# Patient Record
Sex: MALE | Race: WHITE | NOT HISPANIC OR LATINO | Employment: FULL TIME | URBAN - NONMETROPOLITAN AREA
[De-identification: names, ages, dates, MRNs, and addresses within clinical notes are randomized per-mention and may not be internally consistent; named-entity substitution may affect disease eponyms.]

---

## 2020-10-17 ENCOUNTER — APPOINTMENT (OUTPATIENT)
Dept: GENERAL RADIOLOGY | Facility: HOSPITAL | Age: 30
End: 2020-10-17

## 2020-10-17 ENCOUNTER — HOSPITAL ENCOUNTER (EMERGENCY)
Facility: HOSPITAL | Age: 30
Discharge: HOME | End: 2020-10-17
Attending: EMERGENCY MEDICINE | Admitting: EMERGENCY MEDICINE

## 2020-10-17 VITALS
DIASTOLIC BLOOD PRESSURE: 94 MMHG | OXYGEN SATURATION: 98 % | SYSTOLIC BLOOD PRESSURE: 173 MMHG | HEIGHT: 72 IN | WEIGHT: 180 LBS | HEART RATE: 93 BPM | RESPIRATION RATE: 18 BRPM | BODY MASS INDEX: 24.38 KG/M2 | TEMPERATURE: 98.3 F

## 2020-10-17 DIAGNOSIS — S99.921A INJURY OF RIGHT GREAT TOE, INITIAL ENCOUNTER: Primary | ICD-10-CM

## 2020-10-17 PROCEDURE — 73630 X-RAY EXAM OF FOOT: CPT

## 2020-10-17 PROCEDURE — 73630 X-RAY EXAM OF FOOT: CPT | Performed by: RADIOLOGY

## 2020-10-17 PROCEDURE — 99283 EMERGENCY DEPT VISIT LOW MDM: CPT

## 2020-10-17 RX ORDER — IBUPROFEN 800 MG/1
800 TABLET ORAL EVERY 8 HOURS PRN
Qty: 30 TABLET | Refills: 0 | Status: SHIPPED | OUTPATIENT
Start: 2020-10-17

## 2020-10-17 NOTE — ED PROVIDER NOTES
Subjective   Patient is a healthy 30-year-old male that presents the ED after sustaining injury to his right foot.  He states that 2 days ago he stubbed his big toe into a concrete step while walking his dog.  He states that he has had pain, swelling, ecchymosis.  He states symptoms are worse when he tries to bear any weight onto that foot.  He denies numbness or tingling.      History provided by:  Patient   used: No    Foot Pain  Location:  Right foot  Severity:  Moderate  Onset quality:  Sudden  Duration:  2 days  Timing:  Constant  Progression:  Worsening  Chronicity:  New  Context:  Stubbed foot into porch step  Associated symptoms: no abdominal pain, no chest pain, no congestion, no cough, no diarrhea, no ear pain, no fatigue, no fever, no headaches, no loss of consciousness, no myalgias, no nausea, no rash, no rhinorrhea, no shortness of breath, no sore throat, no vomiting and no wheezing        Review of Systems   Constitutional: Negative.  Negative for fatigue and fever.   HENT: Negative.  Negative for congestion, ear discharge, ear pain, facial swelling, postnasal drip, rhinorrhea, sinus pressure, sinus pain, sneezing, sore throat, tinnitus and trouble swallowing.    Eyes: Negative.  Negative for pain, discharge, redness and itching.   Respiratory: Negative.  Negative for cough, shortness of breath and wheezing.    Cardiovascular: Negative.  Negative for chest pain.   Gastrointestinal: Negative.  Negative for abdominal pain, diarrhea, nausea and vomiting.   Endocrine: Negative.    Genitourinary: Negative.  Negative for dysuria.   Musculoskeletal: Positive for arthralgias and joint swelling. Negative for myalgias.   Skin: Negative.  Negative for rash.   Neurological: Negative.  Negative for loss of consciousness and headaches.   Hematological: Negative.    Psychiatric/Behavioral: Negative.    All other systems reviewed and are negative.      No past medical history on file.    No Known  Allergies    No past surgical history on file.    No family history on file.    Social History     Socioeconomic History   • Marital status:      Spouse name: Not on file   • Number of children: Not on file   • Years of education: Not on file   • Highest education level: Not on file           Objective   Physical Exam  Vitals signs and nursing note reviewed.   Constitutional:       General: He is not in acute distress.     Appearance: He is well-developed. He is not diaphoretic.   HENT:      Head: Normocephalic and atraumatic.      Right Ear: External ear normal.      Left Ear: External ear normal.      Nose: Nose normal.   Eyes:      Conjunctiva/sclera: Conjunctivae normal.      Pupils: Pupils are equal, round, and reactive to light.   Neck:      Musculoskeletal: Normal range of motion and neck supple.      Vascular: No JVD.      Trachea: No tracheal deviation.   Cardiovascular:      Rate and Rhythm: Normal rate and regular rhythm.      Heart sounds: Normal heart sounds. No murmur.   Pulmonary:      Effort: Pulmonary effort is normal. No respiratory distress.      Breath sounds: Normal breath sounds. No wheezing.   Abdominal:      General: Bowel sounds are normal.      Palpations: Abdomen is soft.      Tenderness: There is no abdominal tenderness.   Musculoskeletal: Normal range of motion.         General: No deformity.      Comments: Examination the right foot reveals moderate swelling and ecchymosis along the first and second toes.  He has moderate tenderness along the head of both the first and second metatarsals.  Range of motion is limited secondary to pain.  Vascular status is intact.   Skin:     General: Skin is warm and dry.      Coloration: Skin is not pale.      Findings: No erythema or rash.   Neurological:      Mental Status: He is alert and oriented to person, place, and time.      Cranial Nerves: No cranial nerve deficit.   Psychiatric:         Behavior: Behavior normal.         Thought  Content: Thought content normal.         Splint - Cast - Strapping    Date/Time: 10/17/2020 1:31 PM  Performed by: Rosalee Patricio PA-C  Authorized by: Gume Myers MD     Consent:     Consent obtained:  Verbal    Consent given by:  Patient    Risks discussed:  Discoloration, numbness, pain and swelling    Alternatives discussed:  No treatment  Pre-procedure details:     Sensation:  Normal    Skin color:  Pink  Procedure details:     Laterality:  Right    Location:  Foot    Foot:  R foot    Strapping: yes      Splint type:  CAM walker boot    Supplies:  Prefabricated splint  Post-procedure details:     Pain:  Improved    Sensation:  Normal    Skin color:  Pink    Patient tolerance of procedure:  Tolerated well, no immediate complications               ED Course  ED Course as of Oct 17 1331   Sat Oct 17, 2020   1330 IMPRESSION:  No acute osseous or articular abnormalities.     This report was finalized on 10/17/2020 1:27 PM by Dr. Aston Ortiz MD.   XR Foot 3+ View Right []   1330 Discussed plan of care with patient.  Advised if symptoms worsen to return to the ED.  Advised to follow-up with family doctor in 1 to 2 days.    []      ED Course User Index  [] Rosalee Patricio PA-C                                           [facility]    Final diagnoses:   Injury of right great toe, initial encounter            Rosalee Patricio PA-C  10/17/20 1331

## 2024-03-06 ENCOUNTER — OFFICE VISIT (OUTPATIENT)
Dept: FAMILY MEDICINE CLINIC | Facility: CLINIC | Age: 34
End: 2024-03-06
Payer: COMMERCIAL

## 2024-03-06 VITALS
HEIGHT: 72 IN | BODY MASS INDEX: 21.67 KG/M2 | WEIGHT: 160 LBS | OXYGEN SATURATION: 96 % | DIASTOLIC BLOOD PRESSURE: 70 MMHG | HEART RATE: 82 BPM | TEMPERATURE: 98.6 F | SYSTOLIC BLOOD PRESSURE: 112 MMHG

## 2024-03-06 DIAGNOSIS — K21.9 GASTROESOPHAGEAL REFLUX DISEASE, UNSPECIFIED WHETHER ESOPHAGITIS PRESENT: ICD-10-CM

## 2024-03-06 DIAGNOSIS — E55.9 VITAMIN D DEFICIENCY: ICD-10-CM

## 2024-03-06 DIAGNOSIS — R13.10 DYSPHAGIA, UNSPECIFIED TYPE: ICD-10-CM

## 2024-03-06 DIAGNOSIS — R20.2 PARESTHESIAS: ICD-10-CM

## 2024-03-06 DIAGNOSIS — Z00.00 HEALTH MAINTENANCE EXAMINATION: Primary | ICD-10-CM

## 2024-03-06 DIAGNOSIS — R61 NIGHT SWEATS: ICD-10-CM

## 2024-03-06 LAB — H PYLORI IGG SER IA-ACNC: NEGATIVE

## 2024-03-06 PROCEDURE — 86677 HELICOBACTER PYLORI ANTIBODY: CPT | Performed by: PHYSICIAN ASSISTANT

## 2024-03-06 PROCEDURE — 82607 VITAMIN B-12: CPT | Performed by: PHYSICIAN ASSISTANT

## 2024-03-06 PROCEDURE — 82306 VITAMIN D 25 HYDROXY: CPT | Performed by: PHYSICIAN ASSISTANT

## 2024-03-06 PROCEDURE — 83036 HEMOGLOBIN GLYCOSYLATED A1C: CPT | Performed by: PHYSICIAN ASSISTANT

## 2024-03-06 PROCEDURE — 85652 RBC SED RATE AUTOMATED: CPT | Performed by: PHYSICIAN ASSISTANT

## 2024-03-06 PROCEDURE — 86038 ANTINUCLEAR ANTIBODIES: CPT | Performed by: PHYSICIAN ASSISTANT

## 2024-03-06 PROCEDURE — 80050 GENERAL HEALTH PANEL: CPT | Performed by: PHYSICIAN ASSISTANT

## 2024-03-06 PROCEDURE — 86140 C-REACTIVE PROTEIN: CPT | Performed by: PHYSICIAN ASSISTANT

## 2024-03-06 PROCEDURE — 84207 ASSAY OF VITAMIN B-6: CPT | Performed by: PHYSICIAN ASSISTANT

## 2024-03-06 PROCEDURE — 83615 LACTATE (LD) (LDH) ENZYME: CPT | Performed by: PHYSICIAN ASSISTANT

## 2024-03-06 PROCEDURE — 84439 ASSAY OF FREE THYROXINE: CPT | Performed by: PHYSICIAN ASSISTANT

## 2024-03-06 PROCEDURE — 80061 LIPID PANEL: CPT | Performed by: PHYSICIAN ASSISTANT

## 2024-03-06 RX ORDER — PANTOPRAZOLE SODIUM 40 MG/1
40 TABLET, DELAYED RELEASE ORAL DAILY
Qty: 30 TABLET | Refills: 1 | Status: SHIPPED | OUTPATIENT
Start: 2024-03-06

## 2024-03-06 NOTE — PROGRESS NOTES
"    Subjective        Chief Complaint  Establish Care    Subjective      Amrit Santiago is a 34 y.o. male who presents today to [facility] for [facility]. He has no significant past medical history.     Difficulty swallowing:   GERD:   Reports noted increased difficulty with swallowing both solids and liquids over the last few months.  He does have episodes of choking at times which is new for him.  He does report acid reflux symptoms if he eats spicy foods or overeats.  Also worse with lying down at night.  He has recently elevated the head of his bed with some improvement in the acid reflux type symptoms.  No previous endoscopies.  No previous medications.    Nights sweats:   He has also noted intermittent night sweats which at times can be significant to where he has to change his clothes or bedding.  No associated fever or chills.  No recent cough.  No weight loss that he has not been trying for.  No recent changes in his bowel habits.  He does generally have a sense of urgency with his bowel movements after eating certain foods and has always had a \"sensitive stomach.\"  No recent diarrhea, constipation, bright red blood per rectum or black stools.  No recent changes in urine output or hematuria.  Diabetes does run in his family and he does feel sweaty and shaky if he has not eaten for long periods.  Has not checked his sugar during these episodes of diaphoresis.  The spells do generally improved with eating something.  No known thyroid issues or prior testing.  No polydipsia, polyuria, polyphagia.  He does drink a lot of caffeine.    Bilateral foot pins and needle sensation:  Intermittent numbness and tingling of the left upper extremity:  Reports a long history of pins and needle sensation in both of his feet.  This is fairly symmetrical.  No known falls or injuries.  When he stands, he has to hold onto something and steady himself or he may fall.  The left upper extremity " "numbness and tingling is not necessarily brought on by repetitive movements. He may have some improvement with moving the LUE when the symptoms occur. Denies any neck pain or stiffness.  No previous falls, injuries, or surgeries to the left upper extremity. He has chronic joint pains in both hands without any known arthritis. He has worked a lot with his hands repetitively over the years.       Current Outpatient Medications:     pantoprazole (PROTONIX) 40 MG EC tablet, Take 1 tablet by mouth Daily., Disp: 30 tablet, Rfl: 1      Not on File    Objective     Objective   Vital Signs:  /70   Pulse 82   Temp 98.6 °F (37 °C) (Temporal)   Ht 182.9 cm (72\")   Wt 72.6 kg (160 lb)   SpO2 96%   BMI 21.70 kg/m²   Estimated body mass index is 21.7 kg/m² as calculated from the following:    Height as of this encounter: 182.9 cm (72\").    Weight as of this encounter: 72.6 kg (160 lb).    BMI is within normal parameters. No other follow-up for BMI required.    History reviewed. No pertinent past medical history.  History reviewed. No pertinent surgical history.    Social History     Socioeconomic History    Marital status:    Tobacco Use    Smoking status: Never     Passive exposure: Never    Smokeless tobacco: Never   Vaping Use    Vaping status: Never Used   Substance and Sexual Activity    Alcohol use: Yes    Drug use: Never    Sexual activity: Defer      Physical Exam  Vitals and nursing note reviewed.   Constitutional:       General: He is not in acute distress.     Appearance: He is well-developed. He is not diaphoretic.   HENT:      Head: Normocephalic and atraumatic.      Right Ear: Tympanic membrane normal.      Left Ear: Tympanic membrane normal.      Nose: No congestion or rhinorrhea.      Mouth/Throat:      Pharynx: No oropharyngeal exudate or posterior oropharyngeal erythema.   Eyes:      General: No scleral icterus.        Right eye: No discharge.         Left eye: No discharge.      " Conjunctiva/sclera: Conjunctivae normal.   Neck:      Vascular: No carotid bruit.   Cardiovascular:      Rate and Rhythm: Normal rate and regular rhythm.      Heart sounds: Normal heart sounds. No murmur heard.     No friction rub. No gallop.   Pulmonary:      Effort: Pulmonary effort is normal. No respiratory distress.      Breath sounds: Normal breath sounds. No wheezing or rales.   Chest:      Chest wall: No tenderness.   Musculoskeletal:         General: No swelling, tenderness, deformity or signs of injury. Normal range of motion.      Cervical back: Normal range of motion and neck supple.   Lymphadenopathy:      Cervical: No cervical adenopathy.   Skin:     General: Skin is warm and dry.      Coloration: Skin is not pale.      Findings: No erythema or rash.   Neurological:      Mental Status: He is alert and oriented to person, place, and time.   Psychiatric:         Behavior: Behavior normal.        Result Review :  The following data was reviewed by: AZEEM Mancini on 03/06/2024:  HDL Cholesterol   Date Value Ref Range Status   02/05/2014 41 (L) 60 - 100 mg/dL Final     Comment:     DF by IF @ 02/05/2014 07:50  HDL Reference Range:     HDL Normal    >60 mg/dL     HDL Risk        <40 mg/dL       LDL Cholesterol    Date Value Ref Range Status   02/05/2014 53 0 - 100 mg/dL Final     Comment:     DF by IF @ 02/05/2014 07:50  LDL Reference Range:      Optimal                       <100 mg/dL      Near Optimal               100-129 mg/dL      Borderline High            130-159 mg/dL      High                             160-189 mg/dL      Very High                     >189 mg/dL       Triglycerides   Date Value Ref Range Status   02/05/2014 80 0 - 150 mg/dL Final     Comment:     DF by IF @ 02/05/2014 07:50  Triglycerides Reference Range:      Normal                 <150 mg/dL      Borderline High    150-199 mg/dL      High                      200-499 mg/dL      Very High            >499 mg/dL       Total  Cholesterol   Date Value Ref Range Status   02/05/2014 110 0 - 200 mg/dL Final     Comment:     DF by IF @ 02/05/2014 07:50  Cholesterol Reference Range:      Desirable                 < 200mg/dl      Borderline High        200-239mg/dl      High Risk                 > 239mg/dl             Assessment / Plan         Assessment   Diagnoses and all orders for this visit:    1. Health maintenance examination (Primary)  2. Vitamin D deficiency  No previous routine labs, he is agreeable to these today.   Will notify of results when available.   -     CBC & Differential  -     Comprehensive Metabolic Panel  -     Lipid Panel  -     TSH  -     Hemoglobin A1c  -     T4, free  -     Vitamin D 25 hydroxy    3. Dysphagia, unspecified type  4. Gastroesophageal reflux disease, unspecified whether esophagitis present  Discussed avoiding triggering foods, not eating within 1-2 hours before laying down, and elevating the head of the bed.   Labs obtained today, we did obtain a H. Pylori IgG.   Obtain barium esophagram.   Add pantoprazole 40mg daily.   Follow up in 6 weeks for recheck.   -     FL ESOPHAGRAM SINGLE CONTRAST; Future  -     H. Pylori Antibody, IgG (COR and ROBBIN only)        -     pantoprazole (PROTONIX) 40 MG EC tablet; Take 1 tablet by mouth Daily.  Dispense: 30 tablet; Refill: 1    5. Paresthesias  Monitor for triggering activities.   Labs obtained today including vitamin b12, b6, JUAN LUIS, TSH, Free T4.   -     Vitamin B12  -     Vitamin B6    6. Night sweat  Routine labs obtained today including CBC, CMP, TSH, Free T4, hA1c. Also check CRP, LDH, ESR, and JUAN LUIS.  -     C-reactive protein  -     Lactate Dehydrogenase  -     Sedimentation Rate  -     JUAN LUIS       New Medications Ordered This Visit   Medications    pantoprazole (PROTONIX) 40 MG EC tablet     Sig: Take 1 tablet by mouth Daily.     Dispense:  30 tablet     Refill:  1     Health Maintenance  Consider updating Tdap.     Follow Up   Return in about 6 weeks (around  4/17/2024) for Recheck GERD, video visit.    Patient was given instructions and counseling regarding his condition or for health maintenance advice. Please see specific information pulled into the AVS if appropriate.       This document has been electronically signed by AZEEM Mancini   March 6, 2024 13:52 EST    Dictated Utilizing Dragon Dictation: Part of this note may be an electronic transcription/translation of spoken language to printed text using the Dragon Dictation System.

## 2024-03-07 LAB
25(OH)D3 SERPL-MCNC: 26.4 NG/ML (ref 30–100)
ALBUMIN SERPL-MCNC: 4.9 G/DL (ref 3.5–5.2)
ALBUMIN/GLOB SERPL: 2.2 G/DL
ALP SERPL-CCNC: 75 U/L (ref 39–117)
ALT SERPL W P-5'-P-CCNC: 29 U/L (ref 1–41)
ANION GAP SERPL CALCULATED.3IONS-SCNC: 13.8 MMOL/L (ref 5–15)
AST SERPL-CCNC: 21 U/L (ref 1–40)
BASOPHILS # BLD AUTO: 0.03 10*3/MM3 (ref 0–0.2)
BASOPHILS NFR BLD AUTO: 0.6 % (ref 0–1.5)
BILIRUB SERPL-MCNC: 0.8 MG/DL (ref 0–1.2)
BUN SERPL-MCNC: 21 MG/DL (ref 6–20)
BUN/CREAT SERPL: 18.6 (ref 7–25)
CALCIUM SPEC-SCNC: 9.4 MG/DL (ref 8.6–10.5)
CHLORIDE SERPL-SCNC: 101 MMOL/L (ref 98–107)
CHOLEST SERPL-MCNC: 152 MG/DL (ref 0–200)
CO2 SERPL-SCNC: 27.2 MMOL/L (ref 22–29)
CREAT SERPL-MCNC: 1.13 MG/DL (ref 0.76–1.27)
CRP SERPL-MCNC: <0.3 MG/DL (ref 0–0.5)
DEPRECATED RDW RBC AUTO: 38.4 FL (ref 37–54)
EGFRCR SERPLBLD CKD-EPI 2021: 88 ML/MIN/1.73
EOSINOPHIL # BLD AUTO: 0.11 10*3/MM3 (ref 0–0.4)
EOSINOPHIL NFR BLD AUTO: 2.3 % (ref 0.3–6.2)
ERYTHROCYTE [DISTWIDTH] IN BLOOD BY AUTOMATED COUNT: 12.8 % (ref 12.3–15.4)
ERYTHROCYTE [SEDIMENTATION RATE] IN BLOOD: 3 MM/HR (ref 0–15)
GLOBULIN UR ELPH-MCNC: 2.2 GM/DL
GLUCOSE SERPL-MCNC: 97 MG/DL (ref 65–99)
HBA1C MFR BLD: 4.7 % (ref 4.8–5.6)
HCT VFR BLD AUTO: 43.3 % (ref 37.5–51)
HDLC SERPL-MCNC: 49 MG/DL (ref 40–60)
HGB BLD-MCNC: 14.9 G/DL (ref 13–17.7)
IMM GRANULOCYTES # BLD AUTO: 0.02 10*3/MM3 (ref 0–0.05)
IMM GRANULOCYTES NFR BLD AUTO: 0.4 % (ref 0–0.5)
LDH SERPL-CCNC: 233 U/L (ref 135–225)
LDLC SERPL CALC-MCNC: 87 MG/DL (ref 0–100)
LDLC/HDLC SERPL: 1.75 {RATIO}
LYMPHOCYTES # BLD AUTO: 1.52 10*3/MM3 (ref 0.7–3.1)
LYMPHOCYTES NFR BLD AUTO: 32.1 % (ref 19.6–45.3)
MCH RBC QN AUTO: 28.9 PG (ref 26.6–33)
MCHC RBC AUTO-ENTMCNC: 34.4 G/DL (ref 31.5–35.7)
MCV RBC AUTO: 84.1 FL (ref 79–97)
MONOCYTES # BLD AUTO: 0.47 10*3/MM3 (ref 0.1–0.9)
MONOCYTES NFR BLD AUTO: 9.9 % (ref 5–12)
NEUTROPHILS NFR BLD AUTO: 2.59 10*3/MM3 (ref 1.7–7)
NEUTROPHILS NFR BLD AUTO: 54.7 % (ref 42.7–76)
NRBC BLD AUTO-RTO: 0 /100 WBC (ref 0–0.2)
PLATELET # BLD AUTO: 182 10*3/MM3 (ref 140–450)
PMV BLD AUTO: 11.6 FL (ref 6–12)
POTASSIUM SERPL-SCNC: 4.4 MMOL/L (ref 3.5–5.2)
PROT SERPL-MCNC: 7.1 G/DL (ref 6–8.5)
RBC # BLD AUTO: 5.15 10*6/MM3 (ref 4.14–5.8)
SODIUM SERPL-SCNC: 142 MMOL/L (ref 136–145)
T4 FREE SERPL-MCNC: 1.33 NG/DL (ref 0.93–1.7)
TRIGL SERPL-MCNC: 87 MG/DL (ref 0–150)
TSH SERPL DL<=0.05 MIU/L-ACNC: 1.58 UIU/ML (ref 0.27–4.2)
VIT B12 BLD-MCNC: 532 PG/ML (ref 211–946)
VLDLC SERPL-MCNC: 16 MG/DL (ref 5–40)
WBC NRBC COR # BLD AUTO: 4.74 10*3/MM3 (ref 3.4–10.8)

## 2024-03-07 RX ORDER — ERGOCALCIFEROL 1.25 MG/1
50000 CAPSULE ORAL WEEKLY
Qty: 5 CAPSULE | Refills: 1 | Status: SHIPPED | OUTPATIENT
Start: 2024-03-07

## 2024-03-08 LAB — ANA SER QL: NEGATIVE

## 2024-03-11 LAB — PYRIDOXAL PHOS SERPL-MCNC: 17.6 UG/L (ref 3.4–65.2)

## 2024-03-13 ENCOUNTER — PATIENT ROUNDING (BHMG ONLY) (OUTPATIENT)
Dept: FAMILY MEDICINE CLINIC | Facility: CLINIC | Age: 34
End: 2024-03-13
Payer: COMMERCIAL

## 2024-03-13 NOTE — PROGRESS NOTES
March 13, 2024    Hello, may I speak with Amrit Santiago?    My name is Arelis Silva    I am  with [facility]  [address] [place] [id number] [phone].    Before we get started may I verify your date of birth? 1990    I am calling to officially welcome you to our practice and ask about your recent visit. Is this a good time to talk? yes    Tell me about your visit with us. What things went well?  It felt very homey and I liked it there.        We're always looking for ways to make our patients' experiences even better. Do you have recommendations on ways we may improve?  no    Overall were you satisfied with your first visit to our practice? yes       I appreciate you taking the time to speak with me today. Is there anything else I can do for you? no      Thank you, and have a great day.

## 2024-05-13 RX ORDER — PANTOPRAZOLE SODIUM 40 MG/1
40 TABLET, DELAYED RELEASE ORAL DAILY
Qty: 30 TABLET | Refills: 3 | Status: SHIPPED | OUTPATIENT
Start: 2024-05-13

## 2024-10-15 ENCOUNTER — TELEMEDICINE (OUTPATIENT)
Dept: FAMILY MEDICINE CLINIC | Facility: CLINIC | Age: 34
End: 2024-10-15
Payer: COMMERCIAL

## 2024-10-15 DIAGNOSIS — F51.04 PSYCHOPHYSIOLOGICAL INSOMNIA: ICD-10-CM

## 2024-10-15 DIAGNOSIS — F90.9 HYPERACTIVITY (BEHAVIOR): ICD-10-CM

## 2024-10-15 DIAGNOSIS — F42.2 MIXED OBSESSIONAL THOUGHTS AND ACTS: Primary | ICD-10-CM

## 2024-10-15 DIAGNOSIS — Z81.8 FAMILY HISTORY OF OCD (OBSESSIVE COMPULSIVE DISORDER): ICD-10-CM

## 2024-10-15 PROBLEM — K21.9 GASTROESOPHAGEAL REFLUX DISEASE WITHOUT ESOPHAGITIS: Status: ACTIVE | Noted: 2024-10-15

## 2024-10-15 PROCEDURE — 99214 OFFICE O/P EST MOD 30 MIN: CPT | Performed by: PHYSICIAN ASSISTANT

## 2024-10-15 RX ORDER — PANTOPRAZOLE SODIUM 40 MG/1
40 TABLET, DELAYED RELEASE ORAL DAILY PRN
Qty: 30 TABLET | Refills: 5 | Status: SHIPPED | OUTPATIENT
Start: 2024-10-15

## 2024-10-15 RX ORDER — FLUVOXAMINE MALEATE 50 MG
50 TABLET ORAL NIGHTLY
Qty: 30 TABLET | Refills: 0 | Status: SHIPPED | OUTPATIENT
Start: 2024-10-15

## 2024-10-15 RX ORDER — FLUVOXAMINE MALEATE 50 MG
TABLET ORAL
Qty: 60 TABLET | Refills: 0 | Status: SHIPPED | OUTPATIENT
Start: 2024-10-15 | End: 2024-10-15

## 2024-10-15 NOTE — PROGRESS NOTES
Subjective        You have chosen to receive care through a telehealth visit.  Do you consent to use a video/audio connection for your medical care today? Yes. The patient is in his vehicle alone. I am at the [facility] clinic in [place].    Chief Complaint  OCD    Subjective      Amrit Santiago is a 34 y.o. male who presents today to [facility] for OCD. He has a past medical history significant for GERD.    Obsessive behaviors:  Difficulty focusing:  Anxiety:  Depression:  He reports a long history of symptoms concerning for obsessive-compulsive disorder.  He reports he is very particular about his things and things around the house/work.  His tools need to be in a certain order and to be replaced back immediately after use, sometimes before tasks are completed.  He reports soaps in the shower have to be of equal volume. When things don't align how he prefers, he experiences significant frustration and angry outbursts.  He also experiences a lot of anxiety when things are not as he prefers.      Symptoms have been present since he was young. His mother, who is now , had similar tendencies and behaviors. He has a sister with diagnosed bipolar disorder. He has not had any prior psychiatric evaluation, however, is interested given symptoms are interfering with daily life, work, and home life.  He reports his angry outbursts are affecting his family and he does not like how it affects his children, specifically his daughter. He feels like he is difficult to live with. He would also like to be evaluated for ADHD given online questionnaires returning positive, chronic trouble focusing, and hyperactivity.     Also reports difficulty with sleep at night, often only getting about 4 hours of sleep which is interrupted frequently.  Reports racing thoughts and difficulty both falling and staying asleep. He has a new baby in the home, however, reports the child  "sleeps fairly well and does not feel his sleep is interrupted by the child frequently. His wife provides most of the night time care for the child.     Has had periods of depressive symptoms in the past as well. Weight has fluctuated with periods of anxiety and depression. Anxiety generally related to obsessive behaviors noted above. Denies any current or past history of SI/HI. Denies any family history of self-harm or suicide.      Current Outpatient Medications:     pantoprazole (PROTONIX) 40 MG EC tablet, Take 1 tablet by mouth Daily As Needed (Acid reflux.)., Disp: 30 tablet, Rfl: 5    fluvoxaMINE (LUVOX) 50 MG tablet, Take 1 tablet by mouth Every Night., Disp: 30 tablet, Rfl: 0    vitamin D (ERGOCALCIFEROL) 1.25 MG (50847 UT) capsule capsule, Take 1 capsule by mouth 1 (One) Time Per Week., Disp: 5 capsule, Rfl: 1      Not on File    Objective     Objective   Vital Signs:  There were no vitals taken for this visit.  Estimated body mass index is 21.7 kg/m² as calculated from the following:    Height as of 3/6/24: 182.9 cm (72\").    Weight as of 3/6/24: 72.6 kg (160 lb).    BMI is within normal parameters. No other follow-up for BMI required.    Past Medical History:   Diagnosis Date    Gastroesophageal reflux disease without esophagitis 10/15/2024     No past surgical history on file.    Social History     Socioeconomic History    Marital status:    Tobacco Use    Smoking status: Never     Passive exposure: Never    Smokeless tobacco: Never   Vaping Use    Vaping status: Never Used   Substance and Sexual Activity    Alcohol use: Yes    Drug use: Never    Sexual activity: Defer      Physical Exam  Constitutional:       Appearance: Normal appearance.   HENT:      Head: Normocephalic and atraumatic.   Eyes:      Extraocular Movements: Extraocular movements intact.      Conjunctiva/sclera: Conjunctivae normal.   Musculoskeletal:         General: Normal range of motion.      Cervical back: Normal range of " motion and neck supple.   Neurological:      Mental Status: He is alert and oriented to person, place, and time.   Psychiatric:         Mood and Affect: Mood normal.         Behavior: Behavior normal.         Thought Content: Thought content normal.         Judgment: Judgment normal.        Result Review :  The following data was reviewed by: AZEEM Mancini on 03/06/2024:  Hemoglobin A1C   Date Value Ref Range Status   03/06/2024 4.70 (L) 4.80 - 5.60 % Final     TSH   Date Value Ref Range Status   03/06/2024 1.580 0.270 - 4.200 uIU/mL Final     HDL Cholesterol   Date Value Ref Range Status   03/06/2024 49 40 - 60 mg/dL Final     LDL Cholesterol    Date Value Ref Range Status   03/06/2024 87 0 - 100 mg/dL Final     Triglycerides   Date Value Ref Range Status   03/06/2024 87 0 - 150 mg/dL Final     Total Cholesterol   Date Value Ref Range Status   02/05/2014 110 0 - 200 mg/dL Final     Comment:     DF by IF @ 02/05/2014 07:50  Cholesterol Reference Range:      Desirable                 < 200mg/dl      Borderline High        200-239mg/dl      High Risk                 > 239mg/dl             Assessment / Plan         Assessment   Diagnoses and all orders for this visit:    1. Mixed obsessional thoughts and acts  2. Family history of OCD (obsessive compulsive disorder)  3. Psychophysiological insomnia  4. Hyperactivity (behavior)  Given symptoms are reported to interfere with everyday life, discussed option of medical therapy. Discussed risk, benefits, alternatives.  Discussed medications are often gradually advanced as needed and gradually discontinued if needed.  Avoid discontinuing medication suddenly as this can precipitate side effects and sometimes withdrawal symptoms.  He expressed understanding and wishes to proceed.  He is interested in psychiatric referral for the above-mentioned symptoms as well as formal ADHD evaluation.  Refer to [facility] psychiatry.  I did discuss medication options with our  psychiatric nurse practitioner. Will start fluvoxamine 50 mg nightly.  Obtain ACT X swab.  Will follow-up in 2 weeks for close monitoring and dose adjustment if needed.  We discussed that if he is experiencing any side effects work has any concerns, return to clinic sooner.  Go to the ER or call 911 with any development of SI/HI.  - Ambulatory Referral to Psychiatry  - PHARMACOGENOMICS PROFILE, ACTX - Swab,; Future     New Medications Ordered This Visit   Medications    fluvoxaMINE (LUVOX) 50 MG tablet     Sig: Take 1 tablet by mouth Every Night.     Dispense:  30 tablet     Refill:  0    pantoprazole (PROTONIX) 40 MG EC tablet     Sig: Take 1 tablet by mouth Daily As Needed (Acid reflux.).     Dispense:  30 tablet     Refill:  5     Health Maintenance  Consider updating Tdap.     Follow Up   Return in about 2 weeks (around 10/29/2024) for Recheck.    Patient was given instructions and counseling regarding his condition or for health maintenance advice. Please see specific information pulled into the AVS if appropriate.       This document has been electronically signed by AZEEM Mancini   October 15, 2024 21:55 EDT    Dictated Utilizing Dragon Dictation: Part of this note may be an electronic transcription/translation of spoken language to printed text using the Dragon Dictation System.

## 2024-10-29 ENCOUNTER — TELEMEDICINE (OUTPATIENT)
Dept: FAMILY MEDICINE CLINIC | Facility: CLINIC | Age: 34
End: 2024-10-29
Payer: COMMERCIAL

## 2024-10-29 DIAGNOSIS — F90.9 HYPERACTIVITY (BEHAVIOR): ICD-10-CM

## 2024-10-29 DIAGNOSIS — F42.2 MIXED OBSESSIONAL THOUGHTS AND ACTS: Primary | ICD-10-CM

## 2024-10-29 DIAGNOSIS — R60.1 GENERALIZED EDEMA: ICD-10-CM

## 2024-10-29 DIAGNOSIS — E55.9 VITAMIN D DEFICIENCY: ICD-10-CM

## 2024-10-29 DIAGNOSIS — F51.04 PSYCHOPHYSIOLOGICAL INSOMNIA: ICD-10-CM

## 2024-10-29 PROCEDURE — 99214 OFFICE O/P EST MOD 30 MIN: CPT | Performed by: PHYSICIAN ASSISTANT

## 2024-10-29 RX ORDER — FLUVOXAMINE MALEATE 50 MG
75 TABLET ORAL NIGHTLY
Qty: 45 TABLET | Refills: 0 | Status: SHIPPED | OUTPATIENT
Start: 2024-10-29

## 2024-10-29 NOTE — PROGRESS NOTES
Subjective      You have chosen to receive care through a telehealth visit.  Do you consent to use a video/audio connection for your medical care today? Yes. The patient is in his vehicle alone. I am at the [facility] in [place].    Chief Complaint  OCD    Subjective      Amrit Santiago is a 34 y.o. male who presents today to [facility] for OCD. He has a past medical history significant for GERD.    Obsessive behaviors:  Difficulty focusing:  Anxiety:  Depression:   He was recently seen for suspected OCD with obsessive behaviors and difficulty focusing.  Obsessive behaviors cause a lot of agitation and anxiety.  He was started on fluvoxamine at 50 mg nightly.  He reports some initial GI upset and insomnia which have improved over time.  He does report missing 2 doses of his medication over the weekend.  He therefore doubled up today and feels dizzy headed.  Otherwise, he had been tolerating the medication fairly well.  Denies any increase in agitation or compulsive behaviors.  Has not seen significant improvement so far, however, has only been on the medication for about 2 weeks.  He has a pending appointment with psychiatry on 11/18/2024. He has noted some mild edema of his hands and face. No other medication changes or dietary changes.       Current Outpatient Medications:     fluvoxaMINE (LUVOX) 50 MG tablet, Take 1 & 1/2 tablets by mouth Every Night., Disp: 45 tablet, Rfl: 0    pantoprazole (PROTONIX) 40 MG EC tablet, Take 1 tablet by mouth Daily As Needed (Acid reflux.)., Disp: 30 tablet, Rfl: 5    vitamin D (ERGOCALCIFEROL) 1.25 MG (64785 UT) capsule capsule, Take 1 capsule by mouth 1 (One) Time Per Week., Disp: 5 capsule, Rfl: 1      Not on File    Objective     Objective   Vital Signs:  There were no vitals taken for this visit.  Estimated body mass index is 21.7 kg/m² as calculated from the following:    Height as of 3/6/24: 182.9 cm  "(72\").    Weight as of 3/6/24: 72.6 kg (160 lb).    BMI is within normal parameters. No other follow-up for BMI required.    Past Medical History:   Diagnosis Date    Gastroesophageal reflux disease without esophagitis 10/15/2024     No past surgical history on file.    Social History     Socioeconomic History    Marital status:    Tobacco Use    Smoking status: Never     Passive exposure: Never    Smokeless tobacco: Never   Vaping Use    Vaping status: Never Used   Substance and Sexual Activity    Alcohol use: Yes    Drug use: Never    Sexual activity: Defer      Physical Exam  Constitutional:       Appearance: Normal appearance.   HENT:      Head: Normocephalic and atraumatic.   Eyes:      Extraocular Movements: Extraocular movements intact.      Conjunctiva/sclera: Conjunctivae normal.   Musculoskeletal:         General: Normal range of motion.      Cervical back: Normal range of motion and neck supple.   Neurological:      Mental Status: He is alert and oriented to person, place, and time.   Psychiatric:         Mood and Affect: Mood normal.         Behavior: Behavior normal.         Thought Content: Thought content normal.         Judgment: Judgment normal.        Result Review :  The following data was reviewed by: AZEEM Mancini on 03/06/2024:  Hemoglobin A1C   Date Value Ref Range Status   03/06/2024 4.70 (L) 4.80 - 5.60 % Final     TSH   Date Value Ref Range Status   03/06/2024 1.580 0.270 - 4.200 uIU/mL Final     HDL Cholesterol   Date Value Ref Range Status   03/06/2024 49 40 - 60 mg/dL Final     LDL Cholesterol    Date Value Ref Range Status   03/06/2024 87 0 - 100 mg/dL Final     Triglycerides   Date Value Ref Range Status   03/06/2024 87 0 - 150 mg/dL Final     Total Cholesterol   Date Value Ref Range Status   02/05/2014 110 0 - 200 mg/dL Final     Comment:     DF by IF @ 02/05/2014 07:50  Cholesterol Reference Range:      Desirable                 < 200mg/dl      Borderline High        " 200-239mg/dl      High Risk                 > 239mg/dl             Assessment / Plan         Assessment   Diagnoses and all orders for this visit:    1. Mixed obsessional thoughts and acts  2. Psychophysiological insomnia  3. Hyperactivity (behavior)  Continue fluvoxamine.  Recommended setting an alarm especially on the weekends to remember to take his medication.  After he's got readjusted to the 50 mg nightly, will increase to 75 mg nightly over the weekend.  Monitor for improvement.  Also monitor for any worsening symptoms or return to clinic if noted.  He has an upcoming appointment with psychiatry on 11/18/2024, encouraged on keeping this appointment.  ACT X swab pending.  Go to the ER or call 911 with any development of SI/HI.    4. Generalized edema   5. Vitamin D Deficiency   Obtain updated labs with CBC, CMP, TSH. Vitamin D level.  His wife is a MA. I've asked him to have her monitor his BP at home and call with any elevated readings.        New Medications Ordered This Visit   Medications    fluvoxaMINE (LUVOX) 50 MG tablet     Sig: Take 1 & 1/2 tablets by mouth Every Night.     Dispense:  45 tablet     Refill:  0     Health Maintenance  Consider updating Tdap.     Follow Up   Return in about 1 month (around 11/29/2024), or if symptoms worsen or fail to improve, for Video visit, follow up OCD..    Patient was given instructions and counseling regarding his condition or for health maintenance advice. Please see specific information pulled into the AVS if appropriate.       This document has been electronically signed by AZEEM Mancini   October 29, 2024 12:12 EDT    Dictated Utilizing Dragon Dictation: Part of this note may be an electronic transcription/translation of spoken language to printed text using the Dragon Dictation System.

## 2024-10-29 NOTE — LETTER
October 29, 2024     Patient: Amrit Santiago   YOB: 1990   Date of Visit: 10/29/2024       To Whom It May Concern:    Amrit Santiago was seen in my clinic on 10/29/24. He may return to work on 10/30/2024.       Sincerely,        AZEEM Mancini    CC: No Recipients

## 2024-11-18 ENCOUNTER — OFFICE VISIT (OUTPATIENT)
Dept: PSYCHIATRY | Facility: CLINIC | Age: 34
End: 2024-11-18
Payer: COMMERCIAL

## 2024-11-18 VITALS
DIASTOLIC BLOOD PRESSURE: 76 MMHG | WEIGHT: 163.4 LBS | SYSTOLIC BLOOD PRESSURE: 140 MMHG | OXYGEN SATURATION: 98 % | HEART RATE: 108 BPM | BODY MASS INDEX: 22.16 KG/M2

## 2024-11-18 DIAGNOSIS — F90.9 ATTENTION DEFICIT HYPERACTIVITY DISORDER (ADHD), UNSPECIFIED ADHD TYPE: Primary | ICD-10-CM

## 2024-11-18 RX ORDER — ATOMOXETINE 25 MG/1
25 CAPSULE ORAL DAILY
Qty: 30 CAPSULE | Refills: 0 | Status: SHIPPED | OUTPATIENT
Start: 2024-11-18

## 2024-11-18 NOTE — LETTER
November 18, 2024     Patient: Amrit Santiago   YOB: 1990   Date of Visit: 11/18/2024       To Whom it May Concern:    Amrit Santiago was seen in my clinic on 11/18/2024.  Please excuse him from work half a day for appointment. If you have any questions or concerns, please don't hesitate to call.         Sincerely,          ERIC Solis        CC: No Recipients
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
## 2025-01-01 ENCOUNTER — APPOINTMENT (OUTPATIENT)
Dept: CT IMAGING | Facility: HOSPITAL | Age: 35
DRG: 917 | End: 2025-01-01
Payer: COMMERCIAL

## 2025-01-01 ENCOUNTER — APPOINTMENT (OUTPATIENT)
Dept: GENERAL RADIOLOGY | Facility: HOSPITAL | Age: 35
DRG: 917 | End: 2025-01-01
Payer: COMMERCIAL

## 2025-01-01 ENCOUNTER — APPOINTMENT (OUTPATIENT)
Dept: CARDIOLOGY | Facility: HOSPITAL | Age: 35
DRG: 917 | End: 2025-01-01
Payer: COMMERCIAL

## 2025-01-01 ENCOUNTER — APPOINTMENT (OUTPATIENT)
Dept: RESPIRATORY THERAPY | Facility: HOSPITAL | Age: 35
DRG: 917 | End: 2025-01-01
Payer: COMMERCIAL

## 2025-01-01 ENCOUNTER — HOSPITAL ENCOUNTER (INPATIENT)
Facility: HOSPITAL | Age: 35
LOS: 2 days | DRG: 917 | End: 2025-08-17
Attending: EMERGENCY MEDICINE | Admitting: INTERNAL MEDICINE
Payer: COMMERCIAL

## 2025-01-01 PROCEDURE — 99233 SBSQ HOSP IP/OBS HIGH 50: CPT | Performed by: INTERNAL MEDICINE

## 2025-01-03 ENCOUNTER — TELEMEDICINE (OUTPATIENT)
Dept: PSYCHIATRY | Facility: CLINIC | Age: 35
End: 2025-01-03
Payer: COMMERCIAL

## 2025-01-03 DIAGNOSIS — F90.9 ATTENTION DEFICIT HYPERACTIVITY DISORDER (ADHD), UNSPECIFIED ADHD TYPE: ICD-10-CM

## 2025-01-03 RX ORDER — ATOMOXETINE 40 MG/1
40 CAPSULE ORAL DAILY
Qty: 30 CAPSULE | Refills: 1 | Status: SHIPPED | OUTPATIENT
Start: 2025-01-03

## 2025-01-03 NOTE — PROGRESS NOTES
Mikaela Santiago is a 34 y.o. male is here today for medication management follow-up.    This provider is located at [facility] at [address] [place] [address]. The Patient is seen remotely at home, using [employer] max. Patient is being seen via telehealth and stated they are in a secure environment for this session. The patient's condition being diagnosed/treated is appropriate for telemedicine. The provider identified him/herself as well as his or her credentials. The patient and/or patients guardian consent to be seen remotely, and when consent is given they understand that the consent allows for patient identifiable information to be sent to a third party as needed. They may refuse to be seen remotely at any time. The electronic data is encrypted and password protected, and the patient has been advised of the potential risks to privacy not withstanding such measures.      Chief Complaint:  ADHD     History of Present Illness:    Patient presents today for a follow up for medication management for ADHD. Denies any medical changes since last visit. Patient states started the medication and finished the prescription. Denies any side effects. Patient states not sure if working or not. Patient reports still having problems with anger. Patient states still going crazy out of nowhere. Patient reports still struggling with bad OCD. Patient states still all to pieces if not in control of things and realizing things that doing it now. Patient states found out mother was diagnosed was manic depression from aunt. Patient reports feel bad about being upset with kids. Patient states went to a house to work on it and there was trash everywhere sent into panic attack. Patient states had to pack stuff up and leave. Patient states will ponder on things for months and if take on a new skill will accomplish it then perfect the skill. Patient states driving self crazy. Patient reports still  stuttering some and have to pause to get back on track. Patient states still get hyper-focused on work. Patient reports maybe sleeping about 4 hours a night. Denies any nightmares. Patient states never feel tired. Patient states he goes full force as soon as wake up. Patient states noticing making weird noises all the time for no reason. Denies any thoughts to harm self or others. Denies any auditory or visual hallucinations. Denies any changes in appetite.   The following portions of the patient's history were reviewed and updated as appropriate: allergies, current medications, past family history, past medical history, past social history, past surgical history, and problem list.    Review of Systems   Constitutional: Negative.    Respiratory: Negative.     Cardiovascular: Negative.    Gastrointestinal: Negative.    Neurological: Negative.    Psychiatric/Behavioral:  Positive for agitation and sleep disturbance. The patient is nervous/anxious.        Objective   Physical Exam  Constitutional:       Appearance: Normal appearance. He is well-developed and well-groomed.   Neurological:      Mental Status: He is alert.   Psychiatric:         Attention and Perception: Attention and perception normal.         Mood and Affect: Mood and affect normal.         Speech: Speech is rapid and pressured.         Behavior: Behavior normal. Behavior is cooperative.         Thought Content: Thought content normal.         Cognition and Memory: Cognition and memory normal.         Judgment: Judgment is impulsive.       There were no vitals taken for this visit.There is no height or weight on file to calculate BMI.  Unable to obtain vitals due to video visit.     Allergies   Allergen Reactions    Luvox [Fluvoxamine] Swelling       Medication List:   Current Outpatient Medications   Medication Sig Dispense Refill    atomoxetine (Strattera) 40 MG capsule Take 1 capsule by mouth Daily. 30 capsule 1    pantoprazole (PROTONIX) 40 MG EC  tablet Take 1 tablet by mouth Daily As Needed (Acid reflux.). 30 tablet 5     No current facility-administered medications for this visit.       Mental Status Exam:   Hygiene:   good  Cooperation:  Cooperative  Eye Contact:  Good  Psychomotor Behavior:  Restless  Affect:  Appropriate  Hopelessness: Denies  Speech:  Rapid  Thought Process:  Goal directed and Linear  Thought Content:  Normal  Suicidal:  None  Homicidal:  None  Hallucinations:  None  Delusion:  None  Memory:  Intact  Orientation:  Person, Place, Time, and Situation  Reliability:  fair  Insight:  Fair  Judgement:  Fair  Impulse Control:  Poor  Physical/Medical Issues:  No               Assessment & Plan   Diagnoses and all orders for this visit:    1. Attention deficit hyperactivity disorder (ADHD), unspecified ADHD type  -     atomoxetine (Strattera) 40 MG capsule; Take 1 capsule by mouth Daily.  Dispense: 30 capsule; Refill: 1            Discussed medication options with patient. Increase Strattera to 40 mg daily for worsening ADHD. Reviewed the risks, benefits, and side effects of the medications; patient acknowledged and verbally consented.  Patient is agreeable to call the office with any questions, concerns, or worsening of symptoms. Patient is aware to call 911 or go to the nearest ER should begin having any thoughts to harm self or others.          Follow up in four weeks          Errors in dictation may reflect use of voice recognition software and not all errors in transcription may have been detected prior to signing.              This document has been electronically signed by ERIC Solis   January 9, 2025 14:17 EST

## 2025-02-06 DIAGNOSIS — F90.9 ATTENTION DEFICIT HYPERACTIVITY DISORDER (ADHD), UNSPECIFIED ADHD TYPE: ICD-10-CM

## 2025-02-06 RX ORDER — ATOMOXETINE 40 MG/1
40 CAPSULE ORAL DAILY
Qty: 30 CAPSULE | Refills: 1 | Status: SHIPPED | OUTPATIENT
Start: 2025-02-06

## 2025-02-17 ENCOUNTER — OFFICE VISIT (OUTPATIENT)
Dept: PSYCHIATRY | Facility: CLINIC | Age: 35
End: 2025-02-17
Payer: COMMERCIAL

## 2025-02-17 VITALS
BODY MASS INDEX: 23.16 KG/M2 | OXYGEN SATURATION: 95 % | SYSTOLIC BLOOD PRESSURE: 132 MMHG | DIASTOLIC BLOOD PRESSURE: 84 MMHG | HEART RATE: 87 BPM | WEIGHT: 170.8 LBS

## 2025-02-17 DIAGNOSIS — F90.9 ATTENTION DEFICIT HYPERACTIVITY DISORDER (ADHD), UNSPECIFIED ADHD TYPE: Primary | ICD-10-CM

## 2025-02-17 DIAGNOSIS — Z79.899 HIGH RISK MEDICATION USE: ICD-10-CM

## 2025-02-17 RX ORDER — LISDEXAMFETAMINE DIMESYLATE 10 MG/1
10 CAPSULE ORAL DAILY
Qty: 30 CAPSULE | Refills: 0 | Status: SHIPPED | OUTPATIENT
Start: 2025-02-17

## 2025-02-17 NOTE — PROGRESS NOTES
Mikaela Santiago is a 35 y.o. male is here today for medication management follow-up.    Chief Complaint:  ADHD anxiety     History of Present Illness:    Patient presents today for follow up for medication management for ADHD and anxiety. Patient states he didn't take the last medication bottle due to not feeling like it was doing anything to him. Patient states didn't notice if did anything or not to him. Patient states going down wormholes a lot with mental health things. Patient states still dealing with a lot of energy. Patient reports constantly moving hands and tapping feet. Patient states even sitting in lobby feel can run up wall. Patient reports still have certain things that have to do or can't leave when working. Patient states such as going through a whole trailer and check every wall before can leave. Patient states some things struggle with others wanting to work with him because of it. Patient states struggling with losing train of thought in the middle of conversation. Patient states have times in which randomly get sad for no reason then within a few mins a lot of anger. Patient states he does have down moments and have days in which feel all emotions in one day. Denies any depression or sadness. Patient states have so much energy not able to focus. Patient reports happy all the time until triggered. Patient denies any panic attacks. Patient states can't be in any room that is cluttered anymore without having a fit. Patient states can't go into daughters room because of clutter. Patient states not ever tired and get straight out of bed. Patient reports sleeping 4 hours a night and patient denies any nightmares. Patient reports appetite is good. Patient eating 2-3 meals a day. Patient denies any auditory or visual hallucinations. Patient adamantly denies any thoughts to harm self or others. Patient denies any side effects to medications. Patient denies any medical changes since last  visit.   The following portions of the patient's history were reviewed and updated as appropriate: allergies, current medications, past family history, past medical history, past social history, past surgical history, and problem list.    Review of Systems   Constitutional: Negative.    Respiratory: Negative.     Cardiovascular: Negative.    Gastrointestinal: Negative.    Neurological: Negative.    Psychiatric/Behavioral:  Positive for decreased concentration. The patient is hyperactive.        Objective   Physical Exam  Vitals reviewed.   Constitutional:       Appearance: Normal appearance. He is well-developed and well-groomed.   Neurological:      Mental Status: He is alert.   Psychiatric:         Attention and Perception: Attention and perception normal.         Mood and Affect: Affect normal. Mood is anxious.         Speech: Speech is rapid and pressured.         Behavior: Behavior is hyperactive. Behavior is cooperative.         Thought Content: Thought content normal.         Cognition and Memory: Cognition and memory normal.         Judgment: Judgment normal.       Blood pressure 132/84, pulse 87, weight 77.5 kg (170 lb 12.8 oz), SpO2 95%.Body mass index is 23.16 kg/m².    Allergies   Allergen Reactions    Luvox [Fluvoxamine] Swelling       Medication List:   Current Outpatient Medications   Medication Sig Dispense Refill    amoxicillin-clavulanate (AUGMENTIN) 875-125 MG per tablet Take 1 tablet by mouth Every 12 (Twelve) Hours for 10 days. 20 tablet 0    fluticasone (FLONASE) 50 MCG/ACT nasal spray Administer 2 sprays into each nostril daily as directed by provider. 16 g 0    ibuprofen (ADVIL,MOTRIN) 800 MG tablet Take 1 tablet by mouth Every 6 (Six) Hours As Needed for Mild Pain. 30 tablet 0    levocetirizine (XYZAL) 5 MG tablet Take 1 tablet by mouth Every Evening. 30 tablet 0    lisdexamfetamine dimesylate (Vyvanse) 10 MG capsule Take 1 capsule by mouth Daily 30 capsule 0    pantoprazole (PROTONIX) 40 MG  EC tablet Take 1 tablet by mouth Daily As Needed (Acid reflux.). 30 tablet 5     No current facility-administered medications for this visit.       Mental Status Exam:   Hygiene:   good  Cooperation:  Cooperative  Eye Contact:  Good  Psychomotor Behavior:  Restless  Affect:  Appropriate  Hopelessness: Denies  Speech:  Rapid  Thought Process:  Goal directed and Linear  Thought Content:  Normal  Suicidal:  None  Homicidal:  None  Hallucinations:  None  Delusion:  None  Memory:  Intact  Orientation:  Person, Place, Time, and Situation  Reliability:  fair  Insight:  Fair  Judgement:  Fair  Impulse Control:  Fair  Physical/Medical Issues:  No               Assessment & Plan   Diagnoses and all orders for this visit:    1. Attention deficit hyperactivity disorder (ADHD), unspecified ADHD type (Primary)  -     lisdexamfetamine dimesylate (Vyvanse) 10 MG capsule; Take 1 capsule by mouth Daily  Dispense: 30 capsule; Refill: 0    2. High risk medication use  -     Urine Drug Screen - Urine, Clean Catch; Future  -     Urine Drug Screen - Urine, Clean Catch            Discussed medication options with patient. Discont. Strattera. Start Vyvanse 10 mg daily for worsening ADHD. Reviewed the risks, benefits, and side effects of the medications; patient acknowledged and verbally consented.  Patient was provided education regarding treatment and treatment options.  Extensive education is provided regarding stimulants including cardiac risk, risk of growth delay, weight loss, and cardiac issues. Patient is being prescribed a controlled substance as part of treatment plan.   UDS ordered.     [facility] Patient Controlled Substance Report (from 2/29/2024 to 2/28/2025)    Dispensed  Strength Quantity Days Supply Provider Pharmacy   02/24/2025 Lisdexamfetamine Dimesyla 10MG 30 each 30 [facility] ...         Disclaimer    *The information in this report is based upon Schedule II through V controlled substance records  reported by dispensers. Data should appear on [facility] reports within two to three business days after dispensing.   *The records listed in the report are based on the patient identification information entered by the report requestor, and if not sufficiently unique may result in the report including records for multiple patients. Please verify the information in the report by contacting the prescribers and/or dispensers listed.   *If the controlled substance records on this report appear to be in error, the patient or provider should contact the dispenser to determine if the information was reported accurately. If the dispenser certifies the information was reported accurately, the dispenser can contact the Drug Enforcement and Professional Practices Branch at [phone] to investigate the error.   *The information in this report is intended for informational use only by the person authorized to request the report. Intentional disclosure of the report or data to someone not authorized to obtain the data is a Class B Misdemeanor.      Report Restrictions - A practitioner or pharmacist may share the report with the patient or person authorized to act on the patient's behalf and place the report in the patient's medical record, with the report then being deemed a medical record subject to the same disclosure terms and conditions as an ordinary medical record. ([place]202)       Patient is agreeable to call the office with any questions, concerns, or worsening of symptoms. Patient is aware to call 911 or go to the nearest ER should begin having thoughts to harm self or others.        Follow up in four weeks        Errors in dictation may reflect use of voice recognition software and not all errors in transcription may have been detected prior to signing.               This document has been electronically signed by ERIC Solis   February 28, 2025 11:18 EST

## 2025-02-27 ENCOUNTER — TELEMEDICINE (OUTPATIENT)
Dept: FAMILY MEDICINE CLINIC | Facility: CLINIC | Age: 35
End: 2025-02-27
Payer: COMMERCIAL

## 2025-02-27 DIAGNOSIS — J02.9 PHARYNGITIS, UNSPECIFIED ETIOLOGY: Primary | ICD-10-CM

## 2025-02-27 DIAGNOSIS — M79.10 MYALGIA: ICD-10-CM

## 2025-02-27 DIAGNOSIS — J01.00 ACUTE NON-RECURRENT MAXILLARY SINUSITIS: ICD-10-CM

## 2025-02-27 PROCEDURE — 99213 OFFICE O/P EST LOW 20 MIN: CPT | Performed by: PHYSICIAN ASSISTANT

## 2025-02-27 RX ORDER — IBUPROFEN 800 MG/1
800 TABLET, FILM COATED ORAL EVERY 6 HOURS PRN
Qty: 30 TABLET | Refills: 0 | Status: SHIPPED | OUTPATIENT
Start: 2025-02-27

## 2025-02-27 RX ORDER — LEVOCETIRIZINE DIHYDROCHLORIDE 5 MG/1
5 TABLET, FILM COATED ORAL EVERY EVENING
Qty: 30 TABLET | Refills: 0 | Status: SHIPPED | OUTPATIENT
Start: 2025-02-27

## 2025-02-27 RX ORDER — FLUTICASONE PROPIONATE 50 MCG
2 SPRAY, SUSPENSION (ML) NASAL DAILY
Qty: 16 G | Refills: 0 | Status: SHIPPED | OUTPATIENT
Start: 2025-02-27

## 2025-02-27 NOTE — LETTER
February 27, 2025     Patient: Amrit Santiago   YOB: 1990   Date of Visit: 2/27/2025       To Whom It May Concern:    It is my medical opinion that Amrit Santiago may return to work in two days.         Sincerely,        AZEEM Mancini    CC: No Recipients

## 2025-02-27 NOTE — PROGRESS NOTES
Subjective      You have chosen to receive care through a telehealth visit.  Do you consent to use a video/audio connection for your medical care today? Yes. The patient is in his vehicle alone. I am at the [facility] in [place].    Chief Complaint  Sore Throat    Subjective      Amrit Santiago is a 35 y.o. male who presents today to [facility] for Sore Throat. He has a past medical history significant for GERD.    Sore throat:  Body aches:   He reports being ill with influenza A about 3 weeks ago.  He reports about 3 days of illness, then gradually feeling better.  Since then, he has had recurrent sore throat, sinus congestion, bilateral maxillary sinus tenderness.  He has also had generalized bodyaches and fatigue.  Denies any known recent fever or chills.  Has not appreciated any nodules in the neck, but is also not checked.  Reports throat has been red and irritated.      Current Outpatient Medications:     amoxicillin-clavulanate (AUGMENTIN) 875-125 MG per tablet, Take 1 tablet by mouth Every 12 (Twelve) Hours for 10 days., Disp: 20 tablet, Rfl: 0    fluticasone (FLONASE) 50 MCG/ACT nasal spray, Administer 2 sprays into each nostril daily as directed by provider., Disp: 16 g, Rfl: 0    ibuprofen (ADVIL,MOTRIN) 800 MG tablet, Take 1 tablet by mouth Every 6 (Six) Hours As Needed for Mild Pain., Disp: 30 tablet, Rfl: 0    levocetirizine (XYZAL) 5 MG tablet, Take 1 tablet by mouth Every Evening., Disp: 30 tablet, Rfl: 0    lisdexamfetamine dimesylate (Vyvanse) 10 MG capsule, Take 1 capsule by mouth Daily, Disp: 30 capsule, Rfl: 0    pantoprazole (PROTONIX) 40 MG EC tablet, Take 1 tablet by mouth Daily As Needed (Acid reflux.)., Disp: 30 tablet, Rfl: 5      Allergies   Allergen Reactions    Luvox [Fluvoxamine] Swelling     Objective     Objective   Vital Signs:  There were no vitals taken for this visit.  Estimated body mass index is 23.16 kg/m² as  "calculated from the following:    Height as of 3/6/24: 182.9 cm (72\").    Weight as of 2/17/25: 77.5 kg (170 lb 12.8 oz).    BMI is within normal parameters. No other follow-up for BMI required.    Past Medical History:   Diagnosis Date    Gastroesophageal reflux disease without esophagitis 10/15/2024    Tamarack teeth removed      No past surgical history on file.    Social History     Socioeconomic History    Marital status:    Tobacco Use    Smoking status: Never     Passive exposure: Never    Smokeless tobacco: Never   Vaping Use    Vaping status: Never Used   Substance and Sexual Activity    Alcohol use: Yes    Drug use: Never    Sexual activity: Defer      Physical Exam  Constitutional:       Appearance: Normal appearance.   HENT:      Head: Normocephalic and atraumatic.   Eyes:      Extraocular Movements: Extraocular movements intact.      Conjunctiva/sclera: Conjunctivae normal.   Musculoskeletal:         General: Normal range of motion.      Cervical back: Normal range of motion and neck supple.   Neurological:      Mental Status: He is alert and oriented to person, place, and time.   Psychiatric:         Mood and Affect: Mood normal.         Behavior: Behavior normal.         Thought Content: Thought content normal.         Judgment: Judgment normal.        Result Review :  The following data was reviewed by: AZEEM Mancini on 03/06/2024:  Hemoglobin A1C   Date Value Ref Range Status   03/06/2024 4.70 (L) 4.80 - 5.60 % Final     TSH   Date Value Ref Range Status   03/06/2024 1.580 0.270 - 4.200 uIU/mL Final     HDL Cholesterol   Date Value Ref Range Status   03/06/2024 49 40 - 60 mg/dL Final     LDL Cholesterol    Date Value Ref Range Status   03/06/2024 87 0 - 100 mg/dL Final     Triglycerides   Date Value Ref Range Status   03/06/2024 87 0 - 150 mg/dL Final     Total Cholesterol   Date Value Ref Range Status   02/05/2014 110 0 - 200 mg/dL Final     Comment:     DF by IF @ 02/05/2014 " 07:50  Cholesterol Reference Range:      Desirable                 < 200mg/dl      Borderline High        200-239mg/dl      High Risk                 > 239mg/dl             Assessment / Plan         Assessment   Diagnoses and all orders for this visit:    1. Pharyngitis, unspecified etiology  2. Acute non-recurrent maxillary sinusitis  3. Myalgia  -Continue supportive care with rest, adequate oral fluid intake, symptomatic care.   -Could consider at home COVID testing or could come in for a COVID/flu A/flu B swab.  -Will cover for secondary bacterial pharyngitis/sinusitis with Augmentin 875-125 mg twice daily x 10 days.  -Add Xyzal 5 mg nightly.  -Add fluticasone 2 sprays each nostril daily.  -Add ibuprofen 100 mg 3 times daily as needed for myalgias.    -Increase oral fluid intake with water, Gatorade, Pedialyte, Powerade, Etc.  -Return to clinic if no improvement noted or if symptoms are worsening.        New Medications Ordered This Visit   Medications    amoxicillin-clavulanate (AUGMENTIN) 875-125 MG per tablet     Sig: Take 1 tablet by mouth Every 12 (Twelve) Hours for 10 days.     Dispense:  20 tablet     Refill:  0    levocetirizine (XYZAL) 5 MG tablet     Sig: Take 1 tablet by mouth Every Evening.     Dispense:  30 tablet     Refill:  0    fluticasone (FLONASE) 50 MCG/ACT nasal spray     Sig: Administer 2 sprays into each nostril daily as directed by provider.     Dispense:  16 g     Refill:  0    ibuprofen (ADVIL,MOTRIN) 800 MG tablet     Sig: Take 1 tablet by mouth Every 6 (Six) Hours As Needed for Mild Pain.     Dispense:  30 tablet     Refill:  0     Health Maintenance  Consider updating Tdap.     Follow Up   Return if symptoms worsen or fail to improve.    Patient was given instructions and counseling regarding his condition or for health maintenance advice. Please see specific information pulled into the AVS if appropriate.       This document has been electronically signed by AZEEM Mancini    February 27, 2025 12:44 EST    Dictated Utilizing Dragon Dictation: Part of this note may be an electronic transcription/translation of spoken language to printed text using the Dragon Dictation System.

## 2025-03-21 DIAGNOSIS — F90.9 ATTENTION DEFICIT HYPERACTIVITY DISORDER (ADHD), UNSPECIFIED ADHD TYPE: ICD-10-CM

## 2025-03-21 RX ORDER — LISDEXAMFETAMINE DIMESYLATE 10 MG/1
10 CAPSULE ORAL DAILY
Qty: 30 CAPSULE | Refills: 0 | Status: SHIPPED | OUTPATIENT
Start: 2025-03-21

## 2025-03-25 DIAGNOSIS — F90.9 ATTENTION DEFICIT HYPERACTIVITY DISORDER (ADHD), UNSPECIFIED ADHD TYPE: Primary | ICD-10-CM

## 2025-03-25 RX ORDER — DEXTROAMPHETAMINE SACCHARATE, AMPHETAMINE ASPARTATE, DEXTROAMPHETAMINE SULFATE AND AMPHETAMINE SULFATE 1.25; 1.25; 1.25; 1.25 MG/1; MG/1; MG/1; MG/1
5 TABLET ORAL 2 TIMES DAILY
Qty: 60 TABLET | Refills: 0 | Status: SHIPPED | OUTPATIENT
Start: 2025-03-25 | End: 2026-03-25

## 2025-04-06 ENCOUNTER — HOSPITAL ENCOUNTER (EMERGENCY)
Facility: HOSPITAL | Age: 35
Discharge: STILL ADMITTED OR ADMINISTRATIVE | DRG: 885 | End: 2025-04-06
Attending: STUDENT IN AN ORGANIZED HEALTH CARE EDUCATION/TRAINING PROGRAM | Admitting: STUDENT IN AN ORGANIZED HEALTH CARE EDUCATION/TRAINING PROGRAM
Payer: COMMERCIAL

## 2025-04-06 ENCOUNTER — HOSPITAL ENCOUNTER (INPATIENT)
Facility: HOSPITAL | Age: 35
LOS: 2 days | Discharge: HOME | DRG: 885 | End: 2025-04-08
Attending: PSYCHIATRY & NEUROLOGY | Admitting: PSYCHIATRY & NEUROLOGY
Payer: COMMERCIAL

## 2025-04-06 VITALS
BODY MASS INDEX: 21.7 KG/M2 | RESPIRATION RATE: 18 BRPM | WEIGHT: 155 LBS | SYSTOLIC BLOOD PRESSURE: 145 MMHG | HEART RATE: 105 BPM | DIASTOLIC BLOOD PRESSURE: 96 MMHG | OXYGEN SATURATION: 99 % | TEMPERATURE: 98.5 F | HEIGHT: 71 IN

## 2025-04-06 DIAGNOSIS — F29 PSYCHOSIS, UNSPECIFIED PSYCHOSIS TYPE: Primary | ICD-10-CM

## 2025-04-06 LAB
ALBUMIN SERPL-MCNC: 4.1 G/DL (ref 3.5–5.2)
ALBUMIN/GLOB SERPL: 1.4 G/DL
ALP SERPL-CCNC: 79 U/L (ref 39–117)
ALT SERPL W P-5'-P-CCNC: 20 U/L (ref 1–41)
AMPHET+METHAMPHET UR QL: POSITIVE
AMPHETAMINES UR QL: NEGATIVE
ANION GAP SERPL CALCULATED.3IONS-SCNC: 8.1 MMOL/L (ref 5–15)
AST SERPL-CCNC: 19 U/L (ref 1–40)
BACTERIA UR QL AUTO: NORMAL /HPF
BARBITURATES UR QL SCN: NEGATIVE
BASOPHILS # BLD AUTO: 0.07 10*3/MM3 (ref 0–0.2)
BASOPHILS NFR BLD AUTO: 0.6 % (ref 0–1.5)
BENZODIAZ UR QL SCN: NEGATIVE
BILIRUB SERPL-MCNC: 0.4 MG/DL (ref 0–1.2)
BILIRUB UR QL STRIP: NEGATIVE
BUN SERPL-MCNC: 13 MG/DL (ref 6–20)
BUN/CREAT SERPL: 12 (ref 7–25)
BUPRENORPHINE SERPL-MCNC: NEGATIVE NG/ML
CALCIUM SPEC-SCNC: 8.6 MG/DL (ref 8.6–10.5)
CANNABINOIDS SERPL QL: NEGATIVE
CHLORIDE SERPL-SCNC: 100 MMOL/L (ref 98–107)
CLARITY UR: CLEAR
CO2 SERPL-SCNC: 26.9 MMOL/L (ref 22–29)
COCAINE UR QL: POSITIVE
COLOR UR: YELLOW
CREAT SERPL-MCNC: 1.08 MG/DL (ref 0.76–1.27)
DEPRECATED RDW RBC AUTO: 43.9 FL (ref 37–54)
EGFRCR SERPLBLD CKD-EPI 2021: 92.3 ML/MIN/1.73
EOSINOPHIL # BLD AUTO: 0.16 10*3/MM3 (ref 0–0.4)
EOSINOPHIL NFR BLD AUTO: 1.4 % (ref 0.3–6.2)
ERYTHROCYTE [DISTWIDTH] IN BLOOD BY AUTOMATED COUNT: 13.4 % (ref 12.3–15.4)
ETHANOL BLD-MCNC: <10 MG/DL (ref 0–10)
ETHANOL UR QL: <0.01 %
FENTANYL UR-MCNC: NEGATIVE NG/ML
GLOBULIN UR ELPH-MCNC: 3 GM/DL
GLUCOSE SERPL-MCNC: 138 MG/DL (ref 65–99)
GLUCOSE UR STRIP-MCNC: NEGATIVE MG/DL
HAV IGM SERPL QL IA: NORMAL
HBV CORE IGM SERPL QL IA: NORMAL
HBV SURFACE AG SERPL QL IA: NORMAL
HCT VFR BLD AUTO: 48.2 % (ref 37.5–51)
HCV AB SER QL: NORMAL
HGB BLD-MCNC: 15.7 G/DL (ref 13–17.7)
HGB UR QL STRIP.AUTO: NEGATIVE
HOLD SPECIMEN: NORMAL
HOLD SPECIMEN: NORMAL
HYALINE CASTS UR QL AUTO: NORMAL /LPF
IMM GRANULOCYTES # BLD AUTO: 0.05 10*3/MM3 (ref 0–0.05)
IMM GRANULOCYTES NFR BLD AUTO: 0.4 % (ref 0–0.5)
KETONES UR QL STRIP: ABNORMAL
LEUKOCYTE ESTERASE UR QL STRIP.AUTO: ABNORMAL
LYMPHOCYTES # BLD AUTO: 1.88 10*3/MM3 (ref 0.7–3.1)
LYMPHOCYTES NFR BLD AUTO: 16 % (ref 19.6–45.3)
MAGNESIUM SERPL-MCNC: 2.1 MG/DL (ref 1.6–2.6)
MCH RBC QN AUTO: 29.3 PG (ref 26.6–33)
MCHC RBC AUTO-ENTMCNC: 32.6 G/DL (ref 31.5–35.7)
MCV RBC AUTO: 89.9 FL (ref 79–97)
METHADONE UR QL SCN: NEGATIVE
MONOCYTES # BLD AUTO: 0.77 10*3/MM3 (ref 0.1–0.9)
MONOCYTES NFR BLD AUTO: 6.5 % (ref 5–12)
NEUTROPHILS NFR BLD AUTO: 75.1 % (ref 42.7–76)
NEUTROPHILS NFR BLD AUTO: 8.85 10*3/MM3 (ref 1.7–7)
NITRITE UR QL STRIP: NEGATIVE
NRBC BLD AUTO-RTO: 0 /100 WBC (ref 0–0.2)
OPIATES UR QL: NEGATIVE
OXYCODONE UR QL SCN: NEGATIVE
PCP UR QL SCN: NEGATIVE
PH UR STRIP.AUTO: 6.5 [PH] (ref 5–8)
PLATELET # BLD AUTO: 222 10*3/MM3 (ref 140–450)
PMV BLD AUTO: 10.4 FL (ref 6–12)
POTASSIUM SERPL-SCNC: 4.1 MMOL/L (ref 3.5–5.2)
PROT SERPL-MCNC: 7.1 G/DL (ref 6–8.5)
PROT UR QL STRIP: ABNORMAL
RBC # BLD AUTO: 5.36 10*6/MM3 (ref 4.14–5.8)
RBC # UR STRIP: NORMAL /HPF
REF LAB TEST METHOD: NORMAL
SODIUM SERPL-SCNC: 135 MMOL/L (ref 136–145)
SP GR UR STRIP: 1.02 (ref 1–1.03)
SQUAMOUS #/AREA URNS HPF: NORMAL /HPF
TRICYCLICS UR QL SCN: NEGATIVE
UROBILINOGEN UR QL STRIP: ABNORMAL
WBC # UR STRIP: NORMAL /HPF
WBC NRBC COR # BLD AUTO: 11.78 10*3/MM3 (ref 3.4–10.8)
WHOLE BLOOD HOLD COAG: NORMAL
WHOLE BLOOD HOLD SPECIMEN: NORMAL

## 2025-04-06 PROCEDURE — 81001 URINALYSIS AUTO W/SCOPE: CPT | Performed by: PHYSICIAN ASSISTANT

## 2025-04-06 PROCEDURE — 36415 COLL VENOUS BLD VENIPUNCTURE: CPT

## 2025-04-06 PROCEDURE — 82077 ASSAY SPEC XCP UR&BREATH IA: CPT | Performed by: PHYSICIAN ASSISTANT

## 2025-04-06 PROCEDURE — 93010 ELECTROCARDIOGRAM REPORT: CPT | Performed by: INTERNAL MEDICINE

## 2025-04-06 PROCEDURE — 83735 ASSAY OF MAGNESIUM: CPT | Performed by: PHYSICIAN ASSISTANT

## 2025-04-06 PROCEDURE — 80307 DRUG TEST PRSMV CHEM ANLYZR: CPT | Performed by: PHYSICIAN ASSISTANT

## 2025-04-06 PROCEDURE — 80053 COMPREHEN METABOLIC PANEL: CPT | Performed by: PHYSICIAN ASSISTANT

## 2025-04-06 PROCEDURE — 80074 ACUTE HEPATITIS PANEL: CPT | Performed by: PSYCHIATRY & NEUROLOGY

## 2025-04-06 PROCEDURE — 93005 ELECTROCARDIOGRAM TRACING: CPT | Performed by: PSYCHIATRY & NEUROLOGY

## 2025-04-06 PROCEDURE — 99285 EMERGENCY DEPT VISIT HI MDM: CPT

## 2025-04-06 PROCEDURE — 85025 COMPLETE CBC W/AUTO DIFF WBC: CPT | Performed by: PHYSICIAN ASSISTANT

## 2025-04-06 RX ORDER — BENZTROPINE MESYLATE 1 MG/1
2 TABLET ORAL ONCE AS NEEDED
Status: DISCONTINUED | OUTPATIENT
Start: 2025-04-06 | End: 2025-04-08 | Stop reason: HOSPADM

## 2025-04-06 RX ORDER — HALOPERIDOL 5 MG/1
5 TABLET ORAL EVERY 6 HOURS PRN
Status: DISCONTINUED | OUTPATIENT
Start: 2025-04-06 | End: 2025-04-08 | Stop reason: HOSPADM

## 2025-04-06 RX ORDER — TRAZODONE HYDROCHLORIDE 50 MG/1
50 TABLET ORAL NIGHTLY PRN
Status: DISCONTINUED | OUTPATIENT
Start: 2025-04-06 | End: 2025-04-08 | Stop reason: HOSPADM

## 2025-04-06 RX ORDER — OLANZAPINE 5 MG/1
5 TABLET, FILM COATED ORAL ONCE
Status: COMPLETED | OUTPATIENT
Start: 2025-04-06 | End: 2025-04-06

## 2025-04-06 RX ORDER — CETIRIZINE HYDROCHLORIDE 10 MG/1
10 TABLET ORAL DAILY
Status: DISCONTINUED | OUTPATIENT
Start: 2025-04-06 | End: 2025-04-08 | Stop reason: HOSPADM

## 2025-04-06 RX ORDER — IBUPROFEN 400 MG/1
400 TABLET, FILM COATED ORAL EVERY 6 HOURS PRN
Status: DISCONTINUED | OUTPATIENT
Start: 2025-04-06 | End: 2025-04-08 | Stop reason: HOSPADM

## 2025-04-06 RX ORDER — LORAZEPAM 2 MG/ML
2 INJECTION INTRAMUSCULAR EVERY 6 HOURS PRN
Status: DISCONTINUED | OUTPATIENT
Start: 2025-04-06 | End: 2025-04-08 | Stop reason: HOSPADM

## 2025-04-06 RX ORDER — BENZONATATE 100 MG/1
100 CAPSULE ORAL 3 TIMES DAILY PRN
Status: DISCONTINUED | OUTPATIENT
Start: 2025-04-06 | End: 2025-04-08 | Stop reason: HOSPADM

## 2025-04-06 RX ORDER — HYDROXYZINE HYDROCHLORIDE 50 MG/1
50 TABLET, FILM COATED ORAL EVERY 6 HOURS PRN
Status: DISCONTINUED | OUTPATIENT
Start: 2025-04-06 | End: 2025-04-08 | Stop reason: HOSPADM

## 2025-04-06 RX ORDER — POLYETHYLENE GLYCOL 3350 17 G/17G
17 POWDER, FOR SOLUTION ORAL DAILY PRN
Status: DISCONTINUED | OUTPATIENT
Start: 2025-04-06 | End: 2025-04-08 | Stop reason: HOSPADM

## 2025-04-06 RX ORDER — ACETAMINOPHEN 325 MG/1
650 TABLET ORAL EVERY 6 HOURS PRN
Status: DISCONTINUED | OUTPATIENT
Start: 2025-04-06 | End: 2025-04-08 | Stop reason: HOSPADM

## 2025-04-06 RX ORDER — DIPHENHYDRAMINE HCL 25 MG
50 CAPSULE ORAL EVERY 6 HOURS PRN
Status: DISCONTINUED | OUTPATIENT
Start: 2025-04-06 | End: 2025-04-08 | Stop reason: HOSPADM

## 2025-04-06 RX ORDER — FLUTICASONE PROPIONATE 50 MCG
2 SPRAY, SUSPENSION (ML) NASAL DAILY
Status: DISCONTINUED | OUTPATIENT
Start: 2025-04-06 | End: 2025-04-08 | Stop reason: HOSPADM

## 2025-04-06 RX ORDER — IBUPROFEN 400 MG/1
800 TABLET, FILM COATED ORAL EVERY 6 HOURS PRN
Status: CANCELLED | OUTPATIENT
Start: 2025-04-06

## 2025-04-06 RX ORDER — HALOPERIDOL 5 MG/ML
5 INJECTION INTRAMUSCULAR EVERY 6 HOURS PRN
Status: DISCONTINUED | OUTPATIENT
Start: 2025-04-06 | End: 2025-04-08 | Stop reason: HOSPADM

## 2025-04-06 RX ORDER — ALUMINA, MAGNESIA, AND SIMETHICONE 2400; 2400; 240 MG/30ML; MG/30ML; MG/30ML
15 SUSPENSION ORAL EVERY 6 HOURS PRN
Status: DISCONTINUED | OUTPATIENT
Start: 2025-04-06 | End: 2025-04-08 | Stop reason: HOSPADM

## 2025-04-06 RX ORDER — LORAZEPAM 2 MG/ML
2 INJECTION INTRAMUSCULAR ONCE
Status: DISCONTINUED | OUTPATIENT
Start: 2025-04-06 | End: 2025-04-06 | Stop reason: HOSPADM

## 2025-04-06 RX ORDER — ONDANSETRON 4 MG/1
4 TABLET, ORALLY DISINTEGRATING ORAL EVERY 6 HOURS PRN
Status: DISCONTINUED | OUTPATIENT
Start: 2025-04-06 | End: 2025-04-08 | Stop reason: HOSPADM

## 2025-04-06 RX ORDER — BENZTROPINE MESYLATE 1 MG/ML
1 INJECTION, SOLUTION INTRAMUSCULAR; INTRAVENOUS ONCE AS NEEDED
Status: DISCONTINUED | OUTPATIENT
Start: 2025-04-06 | End: 2025-04-08 | Stop reason: HOSPADM

## 2025-04-06 RX ORDER — HALOPERIDOL 5 MG/ML
5 INJECTION INTRAMUSCULAR ONCE
Status: DISCONTINUED | OUTPATIENT
Start: 2025-04-06 | End: 2025-04-06 | Stop reason: HOSPADM

## 2025-04-06 RX ORDER — DIPHENHYDRAMINE HYDROCHLORIDE 50 MG/ML
50 INJECTION, SOLUTION INTRAMUSCULAR; INTRAVENOUS EVERY 6 HOURS PRN
Status: DISCONTINUED | OUTPATIENT
Start: 2025-04-06 | End: 2025-04-08 | Stop reason: HOSPADM

## 2025-04-06 RX ORDER — DIPHENHYDRAMINE HYDROCHLORIDE 50 MG/ML
50 INJECTION, SOLUTION INTRAMUSCULAR; INTRAVENOUS ONCE
Status: DISCONTINUED | OUTPATIENT
Start: 2025-04-06 | End: 2025-04-06 | Stop reason: HOSPADM

## 2025-04-06 RX ORDER — LOPERAMIDE HYDROCHLORIDE 2 MG/1
2 CAPSULE ORAL
Status: DISCONTINUED | OUTPATIENT
Start: 2025-04-06 | End: 2025-04-08 | Stop reason: HOSPADM

## 2025-04-06 RX ORDER — ECHINACEA PURPUREA EXTRACT 125 MG
2 TABLET ORAL AS NEEDED
Status: DISCONTINUED | OUTPATIENT
Start: 2025-04-06 | End: 2025-04-08 | Stop reason: HOSPADM

## 2025-04-06 RX ORDER — DEXTROAMPHETAMINE SACCHARATE, AMPHETAMINE ASPARTATE, DEXTROAMPHETAMINE SULFATE AND AMPHETAMINE SULFATE 1.25; 1.25; 1.25; 1.25 MG/1; MG/1; MG/1; MG/1
5 TABLET ORAL 2 TIMES DAILY
Status: DISCONTINUED | OUTPATIENT
Start: 2025-04-06 | End: 2025-04-07

## 2025-04-06 RX ORDER — NICOTINE 21 MG/24HR
1 PATCH, TRANSDERMAL 24 HOURS TRANSDERMAL
Status: DISCONTINUED | OUTPATIENT
Start: 2025-04-06 | End: 2025-04-07

## 2025-04-06 RX ORDER — PANTOPRAZOLE SODIUM 40 MG/1
40 TABLET, DELAYED RELEASE ORAL DAILY PRN
Status: DISCONTINUED | OUTPATIENT
Start: 2025-04-06 | End: 2025-04-08 | Stop reason: HOSPADM

## 2025-04-06 RX ORDER — FAMOTIDINE 20 MG/1
20 TABLET, FILM COATED ORAL 2 TIMES DAILY PRN
Status: DISCONTINUED | OUTPATIENT
Start: 2025-04-06 | End: 2025-04-08 | Stop reason: HOSPADM

## 2025-04-06 RX ORDER — LORAZEPAM 2 MG/1
2 TABLET ORAL EVERY 6 HOURS PRN
Status: DISCONTINUED | OUTPATIENT
Start: 2025-04-06 | End: 2025-04-08 | Stop reason: HOSPADM

## 2025-04-06 RX ADMIN — DEXTROAMPHETAMINE SACCHARATE, AMPHETAMINE ASPARTATE, DEXTROAMPHETAMINE SULFATE AND AMPHETAMINE SULFATE 5 MG: 1.25; 1.25; 1.25; 1.25 TABLET ORAL at 21:03

## 2025-04-06 RX ADMIN — CETIRIZINE HYDROCHLORIDE 10 MG: 10 TABLET, FILM COATED ORAL at 21:03

## 2025-04-06 RX ADMIN — OLANZAPINE 5 MG: 5 TABLET, FILM COATED ORAL at 21:03

## 2025-04-06 NOTE — NURSING NOTE
"Pt assessment complete   Pt came to Ed with Ibis Bui, friend [phone]  She reports that daughter called her with concerns for the pt due to his increasing paranoid and delusional behavior.   Pt is convinced that his wife is having an affair, and concerned that people are after his family.     Pt reports suicidal ideation stating today and yesterday he attempted to overdose on cocaine.   Pt denies hi   Pt reports hearing voices he will not specify what they say but reports that they are worse at night.   Pt does have paranoid behavior while in intake and has to be frequently redirected.   Pt has multiple stressors including open DCBS case with his kids, and loss of job.   Pt reports he has been using cocaine intranasally for 3 years \"a lot.\" Daily with his last use being 4/6/25  Pt reports occasional alcohol use stating \"I only drink about once a week and I don't drink much when I do.\"   Per friends report mother has a hx of manic depression, and ptsd, and sister has a hx of schizophrenia diagnosis   "

## 2025-04-06 NOTE — ED PROVIDER NOTES
"Subjective   History of Present Illness  35-year-old male who presents to the ED today for a mental health evaluation.  When I ask him why he is here he tells me that he wants me to talk to Ibis.  I asked to Ibis was and he states it is \"my daughter's therapist and she knows all about my life.\"  Ibis states that he has been very paranoid and delusional.  He feels like his wife is seeing someone else but there is no basis for this.  The patient denies any suicidal ideations.  He does shake his head yes when I ask if he is homicidal but does not report any specific person.  He states \"I am just done with this shit.\"  He states he does use cocaine and used today.  He states he does drink alcohol but \"not a whole lot.\"  When I asked him if he is hallucinating, he states \"they tell me I am.\"  He states he is prescribed Adderall but takes it more than prescribed.    History provided by:  Patient  Mental Health Problem  Presenting symptoms: agitation, delusional, hallucinations, paranoid behavior and suicidal thoughts (reported SI to intake nurse)    Degree of incapacity (severity):  Severe  Onset quality:  Unable to specify  Timing:  Unable to specify  Progression:  Unable to specify  Context: alcohol use and drug abuse    Associated symptoms: irritability        Review of Systems   Constitutional:  Positive for irritability.   HENT: Negative.     Eyes: Negative.    Respiratory: Negative.     Cardiovascular: Negative.    Gastrointestinal: Negative.    Genitourinary: Negative.    Musculoskeletal: Negative.    Skin: Negative.    Neurological: Negative.    Psychiatric/Behavioral:  Positive for agitation, hallucinations, paranoia and suicidal ideas (reported SI to intake nurse).    All other systems reviewed and are negative.      Past Medical History:   Diagnosis Date    Gastroesophageal reflux disease without esophagitis 10/15/2024    Heart murmur     as a baby    [place] teeth removed        Allergies   Allergen " Reactions    Luvox [Fluvoxamine] Swelling       Past Surgical History:   Procedure Laterality Date    WISDOM TOOTH EXTRACTION         Family History   Problem Relation Age of Onset    Bipolar disorder Sister        Social History     Socioeconomic History    Marital status:      Spouse name: Clary Santiago (Spouse) [phone] ([place])    Number of children: 4    Years of education: 12th    Highest education level: High school graduate   Tobacco Use    Smoking status: Never     Passive exposure: Never    Smokeless tobacco: Never   Vaping Use    Vaping status: Never Used   Substance and Sexual Activity    Alcohol use: Yes     Comment: occasional    Drug use: Yes     Types: Cocaine(coke)    Sexual activity: Defer           Objective   Physical Exam  Vitals and nursing note reviewed.   Constitutional:       General: He is not in acute distress.     Appearance: Normal appearance.   HENT:      Head: Normocephalic and atraumatic.      Right Ear: External ear normal.      Left Ear: External ear normal.      Nose: Nose normal.   Eyes:      Conjunctiva/sclera: Conjunctivae normal.      Pupils: Pupils are equal, round, and reactive to light.   Cardiovascular:      Rate and Rhythm: Regular rhythm. Tachycardia present.      Pulses: Normal pulses.      Heart sounds: Normal heart sounds.   Pulmonary:      Effort: Pulmonary effort is normal.      Breath sounds: Normal breath sounds.   Abdominal:      General: Bowel sounds are normal.      Palpations: Abdomen is soft.   Musculoskeletal:         General: Normal range of motion.      Cervical back: Normal range of motion and neck supple.   Skin:     General: Skin is warm and dry.      Capillary Refill: Capillary refill takes less than 2 seconds.   Neurological:      General: No focal deficit present.      Mental Status: He is alert and oriented to person, place, and time.   Psychiatric:         Mood and Affect: Affect is angry.         Speech: Speech is delayed.          Behavior: Behavior is agitated.         Thought Content: Thought content is paranoid. Thought content includes homicidal and suicidal ideation. Thought content does not include homicidal or suicidal plan.         Procedures       Results for orders placed or performed during the hospital encounter of 04/06/25   Urinalysis With Microscopic If Indicated (No Culture) - Urine, Clean Catch    Collection Time: 04/06/25  5:10 PM    Specimen: Urine, Clean Catch   Result Value Ref Range    Color, UA Yellow Yellow, Straw    Appearance, UA Clear Clear    pH, UA 6.5 5.0 - 8.0    Specific Gravity, UA 1.024 1.005 - 1.030    Glucose, UA Negative Negative    Ketones, UA Trace (A) Negative    Bilirubin, UA Negative Negative    Blood, UA Negative Negative    Protein, UA Trace (A) Negative    Leuk Esterase, UA Trace (A) Negative    Nitrite, UA Negative Negative    Urobilinogen, UA 1.0 E.U./dL 0.2 - 1.0 E.U./dL   Urine Drug Screen - Urine, Clean Catch    Collection Time: 04/06/25  5:10 PM    Specimen: Urine, Clean Catch   Result Value Ref Range    THC, Screen, Urine Negative Negative    Phencyclidine (PCP), Urine Negative Negative    Cocaine Screen, Urine Positive (A) Negative    Methamphetamine, Ur Negative Negative    Opiate Screen Negative Negative    Amphetamine Screen, Urine Positive (A) Negative    Benzodiazepine Screen, Urine Negative Negative    Tricyclic Antidepressants Screen Negative Negative    Methadone Screen, Urine Negative Negative    Barbiturates Screen, Urine Negative Negative    Oxycodone Screen, Urine Negative Negative    Buprenorphine, Screen, Urine Negative Negative   Fentanyl, Urine - Urine, Clean Catch    Collection Time: 04/06/25  5:10 PM    Specimen: Urine, Clean Catch   Result Value Ref Range    Fentanyl, Urine Negative Negative   Urinalysis, Microscopic Only - Urine, Clean Catch    Collection Time: 04/06/25  5:10 PM    Specimen: Urine, Clean Catch   Result Value Ref Range    RBC, UA 0-2 None Seen, 0-2 /HPF     WBC, UA 0-2 None Seen, 0-2 /HPF    Bacteria, UA None Seen None Seen /HPF    Squamous Epithelial Cells, UA 0-2 None Seen, 0-2 /HPF    Hyaline Casts, UA None Seen None Seen /LPF    Methodology Automated Microscopy    Comprehensive Metabolic Panel    Collection Time: 04/06/25  5:25 PM    Specimen: Blood   Result Value Ref Range    Glucose 138 (H) 65 - 99 mg/dL    BUN 13 6 - 20 mg/dL    Creatinine 1.08 0.76 - 1.27 mg/dL    Sodium 135 (L) 136 - 145 mmol/L    Potassium 4.1 3.5 - 5.2 mmol/L    Chloride 100 98 - 107 mmol/L    CO2 26.9 22.0 - 29.0 mmol/L    Calcium 8.6 8.6 - 10.5 mg/dL    Total Protein 7.1 6.0 - 8.5 g/dL    Albumin 4.1 3.5 - 5.2 g/dL    ALT (SGPT) 20 1 - 41 U/L    AST (SGOT) 19 1 - 40 U/L    Alkaline Phosphatase 79 39 - 117 U/L    Total Bilirubin 0.4 0.0 - 1.2 mg/dL    Globulin 3.0 gm/dL    A/G Ratio 1.4 g/dL    BUN/Creatinine Ratio 12.0 7.0 - 25.0    Anion Gap 8.1 5.0 - 15.0 mmol/L    eGFR 92.3 >60.0 mL/min/1.73   Ethanol    Collection Time: 04/06/25  5:25 PM    Specimen: Blood   Result Value Ref Range    Ethanol <10 0 - 10 mg/dL    Ethanol % <0.010 %   Magnesium    Collection Time: 04/06/25  5:25 PM    Specimen: Blood   Result Value Ref Range    Magnesium 2.1 1.6 - 2.6 mg/dL   CBC Auto Differential    Collection Time: 04/06/25  5:25 PM    Specimen: Blood   Result Value Ref Range    WBC 11.78 (H) 3.40 - 10.80 10*3/mm3    RBC 5.36 4.14 - 5.80 10*6/mm3    Hemoglobin 15.7 13.0 - 17.7 g/dL    Hematocrit 48.2 37.5 - 51.0 %    MCV 89.9 79.0 - 97.0 fL    MCH 29.3 26.6 - 33.0 pg    MCHC 32.6 31.5 - 35.7 g/dL    RDW 13.4 12.3 - 15.4 %    RDW-SD 43.9 37.0 - 54.0 fl    MPV 10.4 6.0 - 12.0 fL    Platelets 222 140 - 450 10*3/mm3    Neutrophil % 75.1 42.7 - 76.0 %    Lymphocyte % 16.0 (L) 19.6 - 45.3 %    Monocyte % 6.5 5.0 - 12.0 %    Eosinophil % 1.4 0.3 - 6.2 %    Basophil % 0.6 0.0 - 1.5 %    Immature Grans % 0.4 0.0 - 0.5 %    Neutrophils, Absolute 8.85 (H) 1.70 - 7.00 10*3/mm3    Lymphocytes, Absolute 1.88 0.70  - 3.10 10*3/mm3    Monocytes, Absolute 0.77 0.10 - 0.90 10*3/mm3    Eosinophils, Absolute 0.16 0.00 - 0.40 10*3/mm3    Basophils, Absolute 0.07 0.00 - 0.20 10*3/mm3    Immature Grans, Absolute 0.05 0.00 - 0.05 10*3/mm3    nRBC 0.0 0.0 - 0.2 /100 WBC   Green Top (Gel)    Collection Time: 04/06/25  5:25 PM   Result Value Ref Range    Extra Tube Hold for add-ons.    Lavender Top    Collection Time: 04/06/25  5:25 PM   Result Value Ref Range    Extra Tube hold for add-on    Gold Top - SST    Collection Time: 04/06/25  5:25 PM   Result Value Ref Range    Extra Tube Hold for add-ons.    Light Blue Top    Collection Time: 04/06/25  5:25 PM   Result Value Ref Range    Extra Tube Hold for add-ons.           ED Course  ED Course as of 04/06/25 1921   Sun Apr 06, 2025   1703 They attempted to draw patient's blood and he had a vasovagal syncopal episode.  He became diaphoretic and hypotensive.  He is currently sitting in the chair with a cold rag on the back of his neck.  Symptoms are improving. [AH]   1759 Medically clear for psych [AH]      ED Course User Index  [AH] Bere Morales PA                                                       Medical Decision Making  35-year-old male presents to the ED today for mental health evaluation.  He was medically cleared for the evaluation.  Psychiatry was consulted and he will be admitted.  Please see the intake nurses note for full assessment.    Problems Addressed:  Psychosis, unspecified psychosis type: complicated acute illness or injury    Amount and/or Complexity of Data Reviewed  Labs: ordered.        Final diagnoses:   Psychosis, unspecified psychosis type       ED Disposition  ED Disposition       ED Disposition   DC/Transfer to Behavioral Health    Condition   Stable    Comment   --               No follow-up provider specified.       Medication List      No changes were made to your prescriptions during this visit.            Bere Morales PA  04/06/25 1921

## 2025-04-06 NOTE — NURSING NOTE
Presented pt to DR. Schneider new order to admit sp3 routine orders. Pt may be placed on a 72 hour hold if not agreeable. Olanzapine 5mg one time dose. B52 every 6 hours PRN for agitation offer orally first if pt refuses give IM. [id number]

## 2025-04-07 PROCEDURE — 99223 1ST HOSP IP/OBS HIGH 75: CPT | Performed by: PSYCHIATRY & NEUROLOGY

## 2025-04-07 RX ORDER — BUPROPION HYDROCHLORIDE 150 MG/1
150 TABLET ORAL DAILY
Status: DISCONTINUED | OUTPATIENT
Start: 2025-04-07 | End: 2025-04-08 | Stop reason: HOSPADM

## 2025-04-07 RX ADMIN — CETIRIZINE HYDROCHLORIDE 10 MG: 10 TABLET, FILM COATED ORAL at 12:15

## 2025-04-07 RX ADMIN — TRAZODONE HYDROCHLORIDE 50 MG: 50 TABLET ORAL at 20:54

## 2025-04-07 RX ADMIN — FLUTICASONE PROPIONATE 2 SPRAY: 50 SPRAY, METERED NASAL at 12:15

## 2025-04-07 RX ADMIN — DEXTROAMPHETAMINE SACCHARATE, AMPHETAMINE ASPARTATE, DEXTROAMPHETAMINE SULFATE AND AMPHETAMINE SULFATE 5 MG: 1.25; 1.25; 1.25; 1.25 TABLET ORAL at 12:15

## 2025-04-07 RX ADMIN — SALINE NASAL SPRAY 2 SPRAY: 1.5 SOLUTION NASAL at 20:54

## 2025-04-07 RX ADMIN — BUPROPION HYDROCHLORIDE 150 MG: 150 TABLET, FILM COATED, EXTENDED RELEASE ORAL at 18:09

## 2025-04-07 NOTE — H&P
"INITIAL PSYCHIATRIC HISTORY & PHYSICAL    Patient Identification:  Name:   Amrit Santiago  Age:   35 y.o.  Sex:   male  :   1990  MRN:   [id number]  Visit Number:   [id number]  Primary Care Physician:   Zita Torres PA    SUBJECTIVE    CC/Focus of Exam: Suicidal    HPI: Amrit Santiago is a 35 y.o. male who was admitted on 2025 with complaints of suicidal ideation.  Per intake note Pt came to ED with Ibis Bui, friend [phone]  She reports that daughter called her with concerns for the pt due to his increasing paranoid and delusional behavior.  Pt is convinced that his wife is having an affair, and concerned that people are after his family.  Pt reports suicidal ideation stating today and yesterday he attempted to overdose on cocaine. Pt reports hearing voices he will not specify what they say but reports that they are worse at night. with his last use being 25  Pt reports occasional alcohol use stating \"I only drink about once a week and I don't drink much when I do.\"  Patient denies any tobacco use.  Patient states loss of his job and the case with the [employer] as stressors in his life.  Patient denies any history of physical, mental, or sexual abuse.  Patient rates his appetite as good.  Patient rates his sleep is fair.  Patient denies any nightmares.  Patient denies any anxiety.  Patient rates his depression on a scale of 1-10 with 10 being the most severe a 10.  Patient states that he has suicidal ideation.  Patient denies any homicidal ideation.  Patient states he has hallucinations.  Patient was admitted to [facility] psychiatry for further safety and stabilization.    Available medical/psychiatric records reviewed and incorporated into the current document.     PAST PSYCHIATRIC HX: Patient has had no prior admissions.  Patient denies any outpatient care.    SUBSTANCE USE HX: UDS was positive for amphetamine, cocaine.  Patient reports a 3 year history of cocaine use " and started it to help deal with loss of his mother. He states he snorts about 3 grams on the weekends. He states he can stop it when he wants to and went three months without it.     SOCIAL HX: Patient states he was born and raised in [facility] [place] [facility].  Patient states he currently resides with his family in [place].  Patient states he is  and has 4 children that live with him and his wife.  Patient states the children are from a previous marriage.  Patient states he is currently unemployed.  Patient states he has a high school diploma.  Patient denies any legal issues.    Past Medical History:   Diagnosis Date    Gastroesophageal reflux disease without esophagitis 10/15/2024    Heart murmur     as a baby    [place] teeth removed        Past Surgical History:   Procedure Laterality Date    WISDOM TOOTH EXTRACTION         Family History   Problem Relation Age of Onset    Bipolar disorder Sister          Medications Prior to Admission   Medication Sig Dispense Refill Last Dose/Taking    amphetamine-dextroamphetamine (Adderall) 5 MG tablet Take 1 tablet by mouth 2 (Two) Times a Day. With second dose no later than 3pm 60 tablet 0 4/5/2025    fluticasone (FLONASE) 50 MCG/ACT nasal spray Administer 2 sprays into each nostril daily as directed by provider. 16 g 0 Past Month    ibuprofen (ADVIL,MOTRIN) 800 MG tablet Take 1 tablet by mouth Every 6 (Six) Hours As Needed for Mild Pain. 30 tablet 0 Past Week    levocetirizine (XYZAL) 5 MG tablet Take 1 tablet by mouth Every Evening. 30 tablet 0 Past Week    pantoprazole (PROTONIX) 40 MG EC tablet Take 1 tablet by mouth Daily As Needed (Acid reflux.). 30 tablet 5 4/5/2025           ALLERGIES:  Luvox [fluvoxamine]    Temp:  [97.1 °F (36.2 °C)-98.5 °F (36.9 °C)] 97.1 °F (36.2 °C)  Heart Rate:  [] 57  Resp:  [17-18] 17  BP: (127-147)/(74-96) 127/74    REVIEW OF SYSTEMS:  Review of Systems   Constitutional: Negative.    HENT: Negative.     Eyes: Negative.    Respiratory:  Negative.     Cardiovascular: Negative.    Gastrointestinal: Negative.  Positive for diarrhea.   Endocrine: Negative.    Genitourinary: Negative.    Musculoskeletal: Negative.    Skin: Negative.    Allergic/Immunologic: Negative.    Neurological: Negative.    Hematological: Negative.    Psychiatric/Behavioral:  Positive for dysphoric mood. The patient is nervous/anxious.       See HPI for psychiatric ROS  OBJECTIVE    PHYSICAL EXAM:  Physical Exam  Constitutional:  Appears well-developed and well-nourished.   HENT:   Head: Normocephalic and atraumatic.   Right Ear: External ear normal.   Left Ear: External ear normal.   Mouth/Throat: Oropharynx is clear and moist.   Eyes: Pupils are equal, round, and reactive to light. Conjunctivae and EOM are normal.   Neck: Normal range of motion. Neck supple.   Cardiovascular: Normal rate, regular rhythm and normal heart sounds.    Respiratory: Effort normal and breath sounds normal. No respiratory distress. No wheezes.   GI: Soft. Bowel sounds are normal.No distension. There is no tenderness.   Musculoskeletal: Normal range of motion. No edema or deformity.   Neurological:  Cranial Nerves: I. No anosmia. II: No visual disturbance. III, IV VI: EOMI, PERRLA. V: Corneal reflext intact, no abnormal sensations. VII: No facial palsy, or altered sensation. VIII: Hearing intact, balance intact. IX: Intact ah reflex. X: Normal phonation, swallowing. XI: Normal shrug and head movement. XII: Intact tongue movements  Coordination normal. No lateralizing signs.  Skin: Skin is warm and dry. No rash noted. No erythema.     MENTAL STATUS EXAM:   Hygiene:   fair  Cooperation:  Evasive  Eye Contact:  Closed  Psychomotor Behavior:  Aggitated  Affect:  Appropriate  Hopelessness: 5  Speech:  Minimal  Unable to demonstrate  Thought Content:  Unable to demonstrate  Suicidal:  Suicidal Ideation  Homicidal:  None  Hallucinations:  Auditory  Delusion:  Paranoid  Memory:  Unable to evaluate  Orientation:   Unable to evaluate  Reliability:  poor  Insight:  Poor  Judgement:  Poor  Impulse Control:  Poor      Imaging Results (Last 24 Hours)       ** No results found for the last 24 hours. **             ECG/EMG Results (most recent)       Procedure Component Value Units Date/Time    ECG 12 Lead Other; Baseline Cardiac Status [[id number]] Collected: 04/06/25 2359     Updated: 04/07/25 0004     QT Interval 378 ms      QTC Interval 413 ms     Narrative:      Test Reason : Other~  Blood Pressure :   */*   mmHG  Vent. Rate :  72 BPM     Atrial Rate :  72 BPM     P-R Int : 134 ms          QRS Dur :  90 ms      QT Int : 378 ms       P-R-T Axes :  61  70  68 degrees    QTcB Int : 413 ms    Normal sinus rhythm with sinus arrhythmia  Normal ECG  No previous ECGs available    Referred By:            Confirmed By:              Lab Results   Component Value Date    GLUCOSE 138 (H) 04/06/2025    BUN 13 04/06/2025    CREATININE 1.08 04/06/2025    BCR 12.0 04/06/2025    CO2 26.9 04/06/2025    CALCIUM 8.6 04/06/2025    ALBUMIN 4.1 04/06/2025    AST 19 04/06/2025    ALT 20 04/06/2025       Lab Results   Component Value Date    WBC 11.78 (H) 04/06/2025    HGB 15.7 04/06/2025    HCT 48.2 04/06/2025    MCV 89.9 04/06/2025     04/06/2025       Pain Management Panel          Latest Ref Rng & Units 4/6/2025   Pain Management Panel   Amphetamine, Urine Qual Negative Positive    Barbiturates Screen, Urine Negative Negative    Benzodiazepine Screen, Urine Negative Negative    Buprenorphine, Screen, Urine Negative Negative    Cocaine Screen, Urine Negative Positive    Fentanyl, Urine Negative Negative    Methadone Screen , Urine Negative Negative    Methamphetamine, Ur Negative Negative        Brief Urine Lab Results  (Last result in the past 365 days)        Color   Clarity   Blood   Leuk Est   Nitrite   Protein   CREAT   Urine HCG        04/06/25 1710 Yellow   Clear   Negative   Trace   Negative   Trace                   DATA  Labs  reviewed. Sodium 135, glucose 138. WBC 11.78. UA shows ketones, leukocyte esterase, protein. UDS positive for amphetamine, cocaine.   EKG reviewed. QTc 413.   AMINTA reviewed.   Record reviewed. Patient has been in treatment at the outpatient behavioral health clinic for ADHD.          ASSESSMENT & PLAN:        Psychosis  -Patient claims he had a nervous breakdown, and has hallucinations when he uses cocaine. Supportive treatment      Other recurrent depressive disorder  -Start bupropion  mg daily  -May add low dose aripiprazole or olanzapine.       History of ADHD  -With patient's substance use history and symptoms of paranoia and psychosis, will hold off on starting stimulants      Hospital bed: No    The patient has been admitted for safety and stabilization.  Patient will be monitored for suicidality daily and maintained on Special Precautions Level 3 (q15 min checks) .  The patient will have individual and group therapy with a master's level therapist. A master treatment plan will be developed and agreed upon by the patient and his/her treatment team.  The patient's estimated length of stay in the hospital is 5-7 days.       Written by Celeste Peralta acting as scribe for Dr.Mazhar Sanz signature on this note affirms that the note adequately documents the care provided.   This note was generated using a scribe,   Celeste Peralta MA  04/07/25  9:27 AM EDT    IRoberto MD, personally performed the services described in this documentation as scribed by the above named individual in my presence, and it is both accurate and complete.

## 2025-04-07 NOTE — PLAN OF CARE
Problem: Adult Behavioral Health Plan of Care  Goal: Plan of Care Review  Outcome: Progressing  Flowsheets  Taken 4/7/2025 1226 by Chito Chilel, RN  Outcome Evaluation: PATIENT C/O SEVERE ANXIETY AND DEPRESSION AS ONLY PROBLEMS THIS SHIFT. PATIENT IS EATING AND SLEEPING WELL. PATIENT IS AOX3, COOPERATIVE WITH NO S/S OF ACUTE DISTRESS NOTED.  Taken 4/7/2025 1144 by Gabe Daniels LCSW  Progress: improving  Patient Agreement with Plan of Care: agrees  Plan of Care Reviewed With: patient  Taken 4/7/2025 1040 by Chito Chilel, RN  Patient Agreement with Plan of Care: agrees   Goal Outcome Evaluation:  Plan of Care Reviewed With: patient  Patient Agreement with Plan of Care: agrees        Outcome Evaluation: PATIENT C/O SEVERE ANXIETY AND DEPRESSION AS ONLY PROBLEMS THIS SHIFT. PATIENT IS EATING AND SLEEPING WELL. PATIENT IS AOX3, COOPERATIVE WITH NO S/S OF ACUTE DISTRESS NOTED.

## 2025-04-07 NOTE — PLAN OF CARE
Goal Outcome Evaluation:  Plan of Care Reviewed With: patient  Plan of Care Reviewed With: patient  Patient Agreement with Plan of Care: agrees     Progress: no change  Outcome Evaluation: Patient new admit rates Anxiety 0/10 Deppresion 10/10 Reports he has been having increased suicidal ideation to overdose on cocaine. Apparently, patient has been using intranasally four years everyday. Paranoid delusions, visual hallucinations, hearing voices, and in denial concerning his addidction problem stating I can quit cold turkey any day. He has four children at home from previous marriage. Also, he abuses prescription adderall  says he his life is out of control. Considers possible rehabilitation while sleep deprived for last few weeks his eating routine maintains a regular consistency. Feels shameful about needing to be here says his daughter is big reason he checked in. Patient guarded and it seems difficult for him to discuss current situation and lifestyle. Also, reports having adequate bowel and kidney function. No acute distress noted routine monitoring being maintained.

## 2025-04-07 NOTE — PROGRESS NOTES
[id number]  Spoke with patient's spouse, Clary, for the purpose of gathering collateral information. She reported that the patient's tendency had been to experience manic phases. These would occur every few weeks and would last one or two weeks at a time. During those moments, the patient would be erratic and irritable. Clary also reported that the patient had been under a great deal of stress lately, related to things such as the loss of his mother, losing his job, and court hearings with his children. By her observation, the patient desired control, and he did not cope well when he could not control the outcomes of certain things.    Yesterday, he seemed fine when she left the house, and the next thing she knew she was getting a call from the [facility] on the patient's phone. She also reported concerns that the patient had an addictive personality. She did not want him to get in trouble for having tested positive for cocaine on admission. It was new information to her that he had used cocaine.     In regard to aftercare, she reported that Tenisha Lynn was the practitioner he would continue to see upon discharge. Ibis Bui was his daughter's therapist at [facility], and they had done family therapy with her before. Ibis had a good rapport with the patient, which she supposed had been the reason he contacted her prior to admission.

## 2025-04-07 NOTE — PLAN OF CARE
Goal Outcome Evaluation:  Plan of Care Reviewed With: patient  Patient Agreement with Plan of Care: agrees  Consent Given to Review Plan with: Spouse and therapist  Progress: improving  Outcome Evaluation: Met with patient in the office, completing the social history, the SDOH, and the review of treatment plans. Patient agreeable.      Problem: Adult Behavioral Health Plan of Care  Goal: Plan of Care Review  Outcome: Progressing  Flowsheets (Taken 4/7/2025 1144)  Consent Given to Review Plan with: Spouse and therapist  Progress: improving  Patient Agreement with Plan of Care: agrees  Outcome Evaluation: Met with patient in the office, completing the social history, the SDOH, and the review of treatment plans. Patient agreeable.  Plan of Care Reviewed With: patient     Problem: Adult Behavioral Health Plan of Care  Goal: Patient-Specific Goal (Individualization)  Outcome: Progressing  Flowsheets (Taken 4/7/2025 1144)  Patient Personal Strengths:   resilient   resourceful   stable living environment   socioeconomic stability   intellectual cognitive skills  Patient/Family-Specific Goals (Include Timeframe): Collateral information from spouse  Individualized Care Needs: Aftercare coordination  Anxieties, Fears or Concerns: None verbalized  Patient Vulnerabilities:   substance abuse/addiction   occupational insecurity   lacks insight into illness   poor impulse control     Problem: Adult Behavioral Health Plan of Care  Goal: Optimized Coping Skills in Response to Life Stressors  Intervention: Promote Effective Coping Strategies  Flowsheets (Taken 4/7/2025 1144)  Supportive Measures:   active listening utilized   self-responsibility promoted   counseling provided   positive reinforcement provided     Problem: Adult Behavioral Health Plan of Care  Goal: Develops/Participates in Therapeutic [place] to Support Successful Transition  Intervention: Foster Therapeutic [place]  Flowsheets (Taken 4/7/2025 1144)  Trust  Relationship/Rapport:   care explained   reassurance provided   thoughts/feelings acknowledged   choices provided   emotional support provided   empathic listening provided   questions answered   questions encouraged     Problem: Adult Behavioral Health Plan of Care  Goal: Develops/Participates in Therapeutic [place] to Support Successful Transition  Intervention: Mutually Develop Transition Plan  Flowsheets (Taken 4/7/2025 1144)  Outpatient/Agency/Support Group Needs:   outpatient psychiatric care (specify)   outpatient medication management   outpatient substance abuse treatment (specify)  Discharge Coordination/Progress: [facility]  Transition Support: community resources reviewed  Transportation Anticipated: family or friend will provide  Anticipated Discharge Disposition: home with family  Transportation Concerns: none  Current Discharge Risk:   psychiatric illness   substance use/abuse  Concerns to be Addressed:   mental health   discharge planning   substance/tobacco abuse/use   cognitive/perceptual  Readmission Within the Last 30 Days: no previous admission in last 30 days  Patient/Family Anticipated Services at Transition: mental health services  Patient's Choice of Community Agency(s): Regular therapist and psychiatrist  Patient/Family Anticipates Transition to: home with family  Offered/Gave Vendor List: no     [id number]  D: Patient is a 35-year-old  male currently residing in [place], [place] where he has been living with his spouse and four children. He is currently unemployed, having lost his job three weeks ago. He had held the job with [employer] for 12 years. The patient has a 11th grade education. He was admitted after reporting SI with thoughts of overdosing on cocaine. The report from the ER also made mention of paranoid delusions, but the patient gave no such indication at this time of paranoia. Perhaps it was substance induced. The patient reported regular  use of cocaine had been taking place since his mother  four years ago. The patient also reported the daily use of alcohol for four years, but he has only used it weekly for the last two years. He has no history of psychiatric admission. He sees Tenisha Lynn in [place] and was recently prescribed Adderall for ADHD. He also sees a therapist in [place], [place], but he could not remember the agency she worked for. Patient signed a FALGUNI for his wife.     A: Patient's affect appeared agitated. His mood appeared anxious. The patient was polite and cooperative with the assessment. He often clarified to the therapist that he was a good ace and was not crazy. It seemed important to him that he not be labeled in certain ways. Some clarity may yet be needed in these areas, and the patient seemed to minimizing to one degree or another. The patient's assessment of the situation was that he had had a nervous breakdown, implying presumably that he would be fine now. He attributed much of his current mental health struggles to the overwhelming amount of responsibilities he had in his life. It is also notable that he described himself as a person who likes to be going all the time, and being here was too slow for him.    P: Patient has been placed on special precautions. He will be monitored routinely for his safety. He will follow up with his regular outpatient providers. However, he could not remember the name of the agency for which his therapist worked in [place]. He signed an FALGUNI for his spouse. The therapist will gather collateral information and continue assisting in aftercare coordination.

## 2025-04-08 VITALS
TEMPERATURE: 97.9 F | SYSTOLIC BLOOD PRESSURE: 121 MMHG | OXYGEN SATURATION: 98 % | DIASTOLIC BLOOD PRESSURE: 82 MMHG | WEIGHT: 152.2 LBS | RESPIRATION RATE: 18 BRPM | HEIGHT: 71 IN | BODY MASS INDEX: 21.31 KG/M2 | HEART RATE: 85 BPM

## 2025-04-08 LAB
QT INTERVAL: 378 MS
QTC INTERVAL: 413 MS

## 2025-04-08 PROCEDURE — 99239 HOSP IP/OBS DSCHRG MGMT >30: CPT | Performed by: PSYCHIATRY & NEUROLOGY

## 2025-04-08 RX ORDER — BUPROPION HYDROCHLORIDE 150 MG/1
150 TABLET ORAL DAILY
Qty: 30 TABLET | Refills: 0 | Status: SHIPPED | OUTPATIENT
Start: 2025-04-09

## 2025-04-08 RX ADMIN — BUPROPION HYDROCHLORIDE 150 MG: 150 TABLET, FILM COATED, EXTENDED RELEASE ORAL at 08:46

## 2025-04-08 RX ADMIN — SALINE NASAL SPRAY 2 SPRAY: 1.5 SOLUTION NASAL at 03:12

## 2025-04-08 RX ADMIN — CETIRIZINE HYDROCHLORIDE 10 MG: 10 TABLET, FILM COATED ORAL at 08:46

## 2025-04-08 RX ADMIN — FLUTICASONE PROPIONATE 2 SPRAY: 50 SPRAY, METERED NASAL at 08:46

## 2025-04-08 NOTE — PROGRESS NOTES
"INPATIENT PSYCHIATRIC PROGRESS NOTE    Name:  Amrit Santiago  :  1990  MRN:  [id number]  Visit Number:  [id number]  Length of stay:  2    SUBJECTIVE    CC/Focus of Exam: Psychosis    INTERVAL HISTORY:  The patient reports he is feeling better. He states he is no longer experiencing any hallucinations and realizes that his  cocaine use is causing it. He is conisdering going to rehab treatment. States he feels ready to go and has to take care of his unemployment benefits.   Depression rating 2/10  Anxiety rating 2/10  Sleep: good  Withdrawal sx: None  Cravin/10    Review of Systems   Constitutional: Negative.    HENT: Negative.     Respiratory: Negative.     Cardiovascular: Negative.    Gastrointestinal: Negative.        OBJECTIVE    Temp:  [97.9 °F (36.6 °C)-98.6 °F (37 °C)] 97.9 °F (36.6 °C)  Heart Rate:  [] 85  Resp:  [16-18] 18  BP: (121-147)/(70-94) 121/82    MENTAL STATUS EXAM:  Appearance:Casually dressed, good hygeine.   Cooperation:Cooperative  Psychomotor: No psychomotor agitation/retardation, No EPS, No motor tics  Speech-normal rate, amount.  Mood \"good\"   Affect-congruent, appropriate, stable  Thought Content-goal directed, no delusional material present  Thought process-linear, organized.  Suicidality: No SI  Homicidality: No HI  Perception: No AH/VH  Insight-fair   Judgement-fair    Lab Results (last 24 hours)       ** No results found for the last 24 hours. **               Imaging Results (Last 24 Hours)       ** No results found for the last 24 hours. **               ECG/EMG Results (most recent)       Procedure Component Value Units Date/Time    ECG 12 Lead Other; Baseline Cardiac Status [[id number]] Collected: 25 235     Updated: 25     QT Interval 378 ms      QTC Interval 413 ms     Narrative:      Test Reason : Other~  Blood Pressure :   */*   mmHG  Vent. Rate :  72 BPM     Atrial Rate :  72 BPM     P-R Int : 134 ms          QRS Dur :  90 ms      QT Int : 378 " ms       P-R-T Axes :  61  70  68 degrees    QTcB Int : 413 ms    Normal sinus rhythm with sinus arrhythmia  Normal ECG  No previous ECGs available  Confirmed by Lucas Reyes ([id number]) on 4/8/2025 9:06:11 AM    Referred By:            Confirmed By: Lucas Reyes             ALLERGIES: Luvox [fluvoxamine]    Medication Review:   Scheduled Medications:  buPROPion XL, 150 mg, Oral, Daily  cetirizine, 10 mg, Oral, Daily  fluticasone, 2 spray, Nasal, Daily         PRN Medications:    acetaminophen    aluminum-magnesium hydroxide-simethicone    benzonatate    benztropine **OR** benztropine    diphenhydrAMINE    diphenhydrAMINE    famotidine    haloperidol    haloperidol lactate    hydrOXYzine    ibuprofen    loperamide    LORazepam    LORazepam    magnesium hydroxide    ondansetron ODT    pantoprazole    polyethylene glycol    sodium chloride    traZODone   All medications reviewed.    ASSESSMENT & PLAN:      Psychosis    Psychosis  -Patient claims he had a nervous breakdown, and has hallucinations when he uses cocaine. Supportive treatment       Other recurrent depressive disorder  -Started bupropion  mg daily  -May add low dose aripiprazole or olanzapine.        History of ADHD  -With patient's substance use history and symptoms of paranoia and psychosis, will hold off on starting stimulants    Special precautions: Special Precautions Level 3 (q15 min checks) .    Behavioral Health Treatment Plan and Problem List: I have reviewed and approved the Behavioral Health Treatment Plan and Problem list.  The patient has had a chance to review and agrees with the treatment plan.    Copied text in portions of this note has been reviewed and is accurate as of 04/08/25         Clinician:  Roberto Sanz MD  04/08/25  11:38 EDT

## 2025-04-08 NOTE — PLAN OF CARE
Goal Outcome Evaluation:  Plan of Care Reviewed With: patient  Patient Agreement with Plan of Care: agrees           Patient is discharging today.

## 2025-04-08 NOTE — PLAN OF CARE
Goal Outcome Evaluation:  Plan of Care Reviewed With: patient  Plan of Care Reviewed With: patient  Patient Agreement with Plan of Care: agrees     Progress: improving  Outcome Evaluation: Patient rates anxiety 4/10 depression 10/10 Denies SI/HI/AVH reports adequate bowel, kidney, eating and sleeping routine He is currently experiencing primary stressor loss of job Calm and cooperative No acute signs of distress noted

## 2025-04-08 NOTE — DISCHARGE SUMMARY
":  1990  MRN:  [id number]  Visit Number:  [id number]      Date of Admission:2025   Date of Discharge:  2025    Discharge Diagnosis:  Principal Problem:    Psychosis    Other recurrent depressive disorders    Hx of ADHD.       Admission Diagnosis:  Psychosis [F29]     LALITA Santiago is a 35 y.o. male who was admitted on 2025 with complaints of suicidal ideation.    For details please see H&P dated 25.     Hospital Course  Patient is a 35 y.o. male presented with suicidal ideations and paranoia. The patient was admitted to the [facility] adult unit for safety, further evaluation and treatment.  The patient was started on bupropion  mg daily. Patient was on stimulant medication for his history of ADHD but stimulants were held due to patient's symptoms of paranoia. Patient admitted that his paranoia was caused by his recent cocaine use.    The patient was also able to take part in individual and group counseling sessions and work on appropriate coping skills.  The patient made steady improvement in his mood and expressed feeling more positive and hopeful about future. Sleep and appetite were improved.  The day of discharge the patient was calm, cooperative and pleasant. Mood was reported to be good, and denied SI/HI/AVH. Also reported no medication side effects.        Mental Status Exam upon discharge:   Mood \"good\"   Affect-congruent, appropriate, stable  Thought Content-goal directed, no delusional material present  Thought process-linear, organized.  Suicidality: No SI  Homicidality: No HI  Perception: No AH/VH    Procedures Performed         Consults:   Consults       No orders found from 3/8/2025 to 2025.            Pertinent Test Results:   Admission on 2025   Component Date Value Ref Range Status    Hepatitis B Surface Ag 2025 Non-Reactive  Non-Reactive Final    Hep A IgM 2025 Non-Reactive  Non-Reactive Final    Hep B C IgM 2025 Non-Reactive  " Non-Reactive Final    Hepatitis C Ab 04/06/2025 Non-Reactive  Non-Reactive Final    QT Interval 04/06/2025 378  ms Final    QTC Interval 04/06/2025 413  ms Final   Admission on 04/06/2025, Discharged on 04/06/2025   Component Date Value Ref Range Status    Glucose 04/06/2025 138 (H)  65 - 99 mg/dL Final    BUN 04/06/2025 13  6 - 20 mg/dL Final    Creatinine 04/06/2025 1.08  0.76 - 1.27 mg/dL Final    Sodium 04/06/2025 135 (L)  136 - 145 mmol/L Final    Potassium 04/06/2025 4.1  3.5 - 5.2 mmol/L Final    Chloride 04/06/2025 100  98 - 107 mmol/L Final    CO2 04/06/2025 26.9  22.0 - 29.0 mmol/L Final    Calcium 04/06/2025 8.6  8.6 - 10.5 mg/dL Final    Total Protein 04/06/2025 7.1  6.0 - 8.5 g/dL Final    Albumin 04/06/2025 4.1  3.5 - 5.2 g/dL Final    ALT (SGPT) 04/06/2025 20  1 - 41 U/L Final    AST (SGOT) 04/06/2025 19  1 - 40 U/L Final    Alkaline Phosphatase 04/06/2025 79  39 - 117 U/L Final    Total Bilirubin 04/06/2025 0.4  0.0 - 1.2 mg/dL Final    Globulin 04/06/2025 3.0  gm/dL Final    A/G Ratio 04/06/2025 1.4  g/dL Final    BUN/Creatinine Ratio 04/06/2025 12.0  7.0 - 25.0 Final    Anion Gap 04/06/2025 8.1  5.0 - 15.0 mmol/L Final    eGFR 04/06/2025 92.3  >60.0 mL/min/1.73 Final    Color, UA 04/06/2025 Yellow  Yellow, Straw Final    Appearance, UA 04/06/2025 Clear  Clear Final    pH, UA 04/06/2025 6.5  5.0 - 8.0 Final    Specific Gravity, UA 04/06/2025 1.024  1.005 - 1.030 Final    Glucose, UA 04/06/2025 Negative  Negative Final    Ketones, UA 04/06/2025 Trace (A)  Negative Final    Bilirubin, UA 04/06/2025 Negative  Negative Final    Blood, UA 04/06/2025 Negative  Negative Final    Protein, UA 04/06/2025 Trace (A)  Negative Final    Leuk Esterase, UA 04/06/2025 Trace (A)  Negative Final    Nitrite, UA 04/06/2025 Negative  Negative Final    Urobilinogen, UA 04/06/2025 1.0 E.U./dL  0.2 - 1.0 E.U./dL Final    Ethanol 04/06/2025 <10  0 - 10 mg/dL Final    Ethanol % 04/06/2025 <0.010  % Final    THC, Screen, Urine  04/06/2025 Negative  Negative Final    Phencyclidine (PCP), Urine 04/06/2025 Negative  Negative Final    Cocaine Screen, Urine 04/06/2025 Positive (A)  Negative Final    Methamphetamine, Ur 04/06/2025 Negative  Negative Final    Opiate Screen 04/06/2025 Negative  Negative Final    Amphetamine Screen, Urine 04/06/2025 Positive (A)  Negative Final    Benzodiazepine Screen, Urine 04/06/2025 Negative  Negative Final    Tricyclic Antidepressants Screen 04/06/2025 Negative  Negative Final    Methadone Screen, Urine 04/06/2025 Negative  Negative Final    Barbiturates Screen, Urine 04/06/2025 Negative  Negative Final    Oxycodone Screen, Urine 04/06/2025 Negative  Negative Final    Buprenorphine, Screen, Urine 04/06/2025 Negative  Negative Final    Magnesium 04/06/2025 2.1  1.6 - 2.6 mg/dL Final    WBC 04/06/2025 11.78 (H)  3.40 - 10.80 10*3/mm3 Final    RBC 04/06/2025 5.36  4.14 - 5.80 10*6/mm3 Final    Hemoglobin 04/06/2025 15.7  13.0 - 17.7 g/dL Final    Hematocrit 04/06/2025 48.2  37.5 - 51.0 % Final    MCV 04/06/2025 89.9  79.0 - 97.0 fL Final    MCH 04/06/2025 29.3  26.6 - 33.0 pg Final    MCHC 04/06/2025 32.6  31.5 - 35.7 g/dL Final    RDW 04/06/2025 13.4  12.3 - 15.4 % Final    RDW-SD 04/06/2025 43.9  37.0 - 54.0 fl Final    MPV 04/06/2025 10.4  6.0 - 12.0 fL Final    Platelets 04/06/2025 222  140 - 450 10*3/mm3 Final    Neutrophil % 04/06/2025 75.1  42.7 - 76.0 % Final    Lymphocyte % 04/06/2025 16.0 (L)  19.6 - 45.3 % Final    Monocyte % 04/06/2025 6.5  5.0 - 12.0 % Final    Eosinophil % 04/06/2025 1.4  0.3 - 6.2 % Final    Basophil % 04/06/2025 0.6  0.0 - 1.5 % Final    Immature Grans % 04/06/2025 0.4  0.0 - 0.5 % Final    Neutrophils, Absolute 04/06/2025 8.85 (H)  1.70 - 7.00 10*3/mm3 Final    Lymphocytes, Absolute 04/06/2025 1.88  0.70 - 3.10 10*3/mm3 Final    Monocytes, Absolute 04/06/2025 0.77  0.10 - 0.90 10*3/mm3 Final    Eosinophils, Absolute 04/06/2025 0.16  0.00 - 0.40 10*3/mm3 Final    Basophils,  Absolute 04/06/2025 0.07  0.00 - 0.20 10*3/mm3 Final    Immature Grans, Absolute 04/06/2025 0.05  0.00 - 0.05 10*3/mm3 Final    nRBC 04/06/2025 0.0  0.0 - 0.2 /100 WBC Final    Extra Tube 04/06/2025 Hold for add-ons.   Final    Auto resulted.    Extra Tube 04/06/2025 hold for add-on   Final    Auto resulted    Extra Tube 04/06/2025 Hold for add-ons.   Final    Auto resulted.    Extra Tube 04/06/2025 Hold for add-ons.   Final    Auto resulted    Fentanyl, Urine 04/06/2025 Negative  Negative Final    RBC, UA 04/06/2025 0-2  None Seen, 0-2 /HPF Final    WBC, UA 04/06/2025 0-2  None Seen, 0-2 /HPF Final    Bacteria, UA 04/06/2025 None Seen  None Seen /HPF Final    Squamous Epithelial Cells, UA 04/06/2025 0-2  None Seen, 0-2 /HPF Final    Hyaline Casts, UA 04/06/2025 None Seen  None Seen /LPF Final    Methodology 04/06/2025 Automated Microscopy   Final        Condition on Discharge:  improved    Vital Signs  Temp:  [97.9 °F (36.6 °C)-98.6 °F (37 °C)] 97.9 °F (36.6 °C)  Heart Rate:  [] 85  Resp:  [16-18] 18  BP: (121-147)/(70-94) 121/82      Discharge Disposition:  Home or Self Care    Discharge Medications:     Discharge Medications        New Medications        Instructions Start Date   buPROPion  MG 24 hr tablet  Commonly known as: WELLBUTRIN XL   150 mg, Oral, Daily   Start Date: April 9, 2025            Continue These Medications        Instructions Start Date   fluticasone 50 MCG/ACT nasal spray  Commonly known as: FLONASE   Administer 2 sprays into each nostril daily as directed by provider.      levocetirizine 5 MG tablet  Commonly known as: XYZAL   5 mg, Oral, Every Evening      pantoprazole 40 MG EC tablet  Commonly known as: PROTONIX   40 mg, Oral, Daily PRN             Stop These Medications      amphetamine-dextroamphetamine 5 MG tablet  Commonly known as: Adderall     ibuprofen 800 MG tablet  Commonly known as: ADVIL,MOTRIN              Discharge Diet: Regular     Activity at Discharge: As  tolerated     Follow-up Appointments  Future Appointments   Date Time Provider Department [place]   4/14/2025  4:30 PM Shane ERIC Steven [facility] None   5/12/2025  4:30 PM Tenisha Lynn APRN [facility] None   6/9/2025  4:30 PM Shane TenishaERIC butler [facility] None   7/7/2025  4:30 PM Shane TenishaERIC butler [facility] None       Time: I spent  > 30  minutes on this discharge activity which included: face-to-face encounter with the patient, reviewing the data in the system, coordination of the care with the nursing staff as well as consultants, documentation, and entering orders.        Clinician:   Roberto Sanz MD  04/08/25  11:44 EDT

## 2025-04-08 NOTE — PROGRESS NOTES
1310  Therapist contacted patient's spouse, Clary, for the purpose of assessing any further concerns she might have had post-discharge, and for the purpose of facilitating safety planning. She confirmed the patient does not have access to lethal means. She also confirmed his aftercare plans, and she was considering adding a psychotherapy provider to the patient's plans as well. She will call if she has other questions or concerns.

## 2025-08-15 PROBLEM — I46.9 CARDIAC ARREST: Status: ACTIVE | Noted: 2025-01-01
